# Patient Record
Sex: MALE | Race: WHITE | Employment: PART TIME | URBAN - METROPOLITAN AREA
[De-identification: names, ages, dates, MRNs, and addresses within clinical notes are randomized per-mention and may not be internally consistent; named-entity substitution may affect disease eponyms.]

---
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
## 2021-02-04 ENCOUNTER — HOSPITAL ENCOUNTER (EMERGENCY)
Age: 21
Discharge: HOME | End: 2021-02-04
Attending: EMERGENCY MEDICINE
Payer: COMMERCIAL

## 2021-02-04 ENCOUNTER — APPOINTMENT (OUTPATIENT)
Dept: GENERAL RADIOLOGY | Age: 21
End: 2021-02-04
Payer: COMMERCIAL

## 2021-02-04 ENCOUNTER — APPOINTMENT (OUTPATIENT)
Dept: ULTRASOUND IMAGING | Age: 21
End: 2021-02-04
Payer: COMMERCIAL

## 2021-02-04 VITALS
BODY MASS INDEX: 16.86 KG/M2 | RESPIRATION RATE: 18 BRPM | DIASTOLIC BLOOD PRESSURE: 80 MMHG | OXYGEN SATURATION: 100 % | HEART RATE: 87 BPM | HEIGHT: 72 IN | TEMPERATURE: 99.4 F | SYSTOLIC BLOOD PRESSURE: 100 MMHG | WEIGHT: 124.5 LBS

## 2021-02-04 DIAGNOSIS — B27.99 INFECTIOUS MONONUCLEOSIS, WITH OTHER COMPLICATION, INFECTIOUS MONONUCLEOSIS DUE TO UNSPECIFIED ORGANISM: ICD-10-CM

## 2021-02-04 DIAGNOSIS — R53.83 FATIGUE, UNSPECIFIED TYPE: Primary | ICD-10-CM

## 2021-02-04 DIAGNOSIS — R74.01 TRANSAMINITIS: ICD-10-CM

## 2021-02-04 LAB
A/G RATIO: 1.3 (ref 1.1–2.2)
ACETAMINOPHEN LEVEL: <5 UG/ML (ref 10–30)
ALBUMIN SERPL-MCNC: 4.2 G/DL (ref 3.4–5)
ALP BLD-CCNC: 216 U/L (ref 40–129)
ALT SERPL-CCNC: 668 U/L (ref 10–40)
ANION GAP SERPL CALCULATED.3IONS-SCNC: 13 MMOL/L (ref 3–16)
AST SERPL-CCNC: 499 U/L (ref 15–37)
ATYPICAL LYMPHOCYTE RELATIVE PERCENT: 12 % (ref 0–6)
BANDED NEUTROPHILS RELATIVE PERCENT: 4 % (ref 0–7)
BASOPHILS ABSOLUTE: 0.1 K/UL (ref 0–0.2)
BASOPHILS RELATIVE PERCENT: 1 %
BILIRUB SERPL-MCNC: 1.3 MG/DL (ref 0–1)
BUN BLDV-MCNC: 6 MG/DL (ref 7–20)
CALCIUM SERPL-MCNC: 9 MG/DL (ref 8.3–10.6)
CHLORIDE BLD-SCNC: 100 MMOL/L (ref 99–110)
CO2: 23 MMOL/L (ref 21–32)
CREAT SERPL-MCNC: 0.8 MG/DL (ref 0.9–1.3)
EOSINOPHILS ABSOLUTE: 0 K/UL (ref 0–0.6)
EOSINOPHILS RELATIVE PERCENT: 0 %
GFR AFRICAN AMERICAN: >60
GFR NON-AFRICAN AMERICAN: >60
GLOBULIN: 3.3 G/DL
GLUCOSE BLD-MCNC: 83 MG/DL (ref 70–99)
HAV IGM SER IA-ACNC: NORMAL
HCT VFR BLD CALC: 47.2 % (ref 40.5–52.5)
HEMATOLOGY PATH CONSULT: ABNORMAL
HEMOGLOBIN: 15.6 G/DL (ref 13.5–17.5)
HEPATITIS B CORE IGM ANTIBODY: NORMAL
HEPATITIS B SURFACE ANTIGEN INTERPRETATION: NORMAL
HEPATITIS C ANTIBODY INTERPRETATION: NORMAL
LIPASE: 22 U/L (ref 13–60)
LYMPHOCYTES ABSOLUTE: 3.9 K/UL (ref 1–5.1)
LYMPHOCYTES RELATIVE PERCENT: 35 %
MACROCYTES: ABNORMAL
MCH RBC QN AUTO: 29.6 PG (ref 26–34)
MCHC RBC AUTO-ENTMCNC: 33 G/DL (ref 31–36)
MCV RBC AUTO: 89.7 FL (ref 80–100)
MONO TEST: POSITIVE
MONOCYTES ABSOLUTE: 0.4 K/UL (ref 0–1.3)
MONOCYTES RELATIVE PERCENT: 5 %
MONONUCLEAR UNIDENTIFIED CELLS: 2 %
NEUTROPHILS ABSOLUTE: 3.8 K/UL (ref 1.7–7.7)
NEUTROPHILS RELATIVE PERCENT: 41 %
PDW BLD-RTO: 12.9 % (ref 12.4–15.4)
PLATELET # BLD: 157 K/UL (ref 135–450)
PLATELET SLIDE REVIEW: ADEQUATE
PMV BLD AUTO: 8.8 FL (ref 5–10.5)
POLYCHROMASIA: ABNORMAL
POTASSIUM SERPL-SCNC: 4.3 MMOL/L (ref 3.5–5.1)
RBC # BLD: 5.26 M/UL (ref 4.2–5.9)
S PYO AG THROAT QL: NEGATIVE
SLIDE REVIEW: ABNORMAL
SODIUM BLD-SCNC: 136 MMOL/L (ref 136–145)
TEAR DROP CELLS: ABNORMAL
TOTAL PROTEIN: 7.5 G/DL (ref 6.4–8.2)
WBC # BLD: 8.4 K/UL (ref 4–11)

## 2021-02-04 PROCEDURE — 85025 COMPLETE CBC W/AUTO DIFF WBC: CPT

## 2021-02-04 PROCEDURE — 87081 CULTURE SCREEN ONLY: CPT

## 2021-02-04 PROCEDURE — 86038 ANTINUCLEAR ANTIBODIES: CPT

## 2021-02-04 PROCEDURE — 71045 X-RAY EXAM CHEST 1 VIEW: CPT

## 2021-02-04 PROCEDURE — 87880 STREP A ASSAY W/OPTIC: CPT

## 2021-02-04 PROCEDURE — 83690 ASSAY OF LIPASE: CPT

## 2021-02-04 PROCEDURE — 99283 EMERGENCY DEPT VISIT LOW MDM: CPT

## 2021-02-04 PROCEDURE — 80143 DRUG ASSAY ACETAMINOPHEN: CPT

## 2021-02-04 PROCEDURE — 76705 ECHO EXAM OF ABDOMEN: CPT

## 2021-02-04 PROCEDURE — 36415 COLL VENOUS BLD VENIPUNCTURE: CPT

## 2021-02-04 PROCEDURE — 86308 HETEROPHILE ANTIBODY SCREEN: CPT

## 2021-02-04 PROCEDURE — 80053 COMPREHEN METABOLIC PANEL: CPT

## 2021-02-04 PROCEDURE — 83516 IMMUNOASSAY NONANTIBODY: CPT

## 2021-02-04 PROCEDURE — 80074 ACUTE HEPATITIS PANEL: CPT

## 2021-02-04 ASSESSMENT — ENCOUNTER SYMPTOMS
NAUSEA: 0
WHEEZING: 0
TROUBLE SWALLOWING: 0
COUGH: 1
SHORTNESS OF BREATH: 0
VOMITING: 0
BACK PAIN: 0
STRIDOR: 0
SORE THROAT: 1
CONSTIPATION: 0
COLOR CHANGE: 0
DIARRHEA: 0
ABDOMINAL DISTENTION: 0
VOICE CHANGE: 0
ABDOMINAL PAIN: 0

## 2021-02-04 ASSESSMENT — PAIN SCALES - GENERAL: PAINLEVEL_OUTOF10: 4

## 2021-02-04 NOTE — ED PROVIDER NOTES
[facility]        Pt Name: Yancy Mackenzie  MRN: [id number]  Armslizzettegfluis carlos 2000  Date of evaluation: 2/4/2021  Provider: Lenise Osler, PA-C  PCP: Khurram Mckeon DO    I evaluated this patient with supervising physician Dr Ruben Rushing, who did personally assess the patient. CHIEF COMPLAINT       Chief Complaint   Patient presents with    Fatigue     pt c/o feeling fatigued x 1.5 weeks. denies any fever. rapid covid negative, flu negative. HISTORY OF PRESENT ILLNESS   (Location, Timing/Onset, Context/Setting, Quality, Duration, Modifying Factors, Severity, Associated Signs and Symptoms)  Note limiting factors. Yancy Mackenzie is a 21 y.o. male who presents to the emergency department complaining of sore throat, mild cough, fatigue and poor appetite for several weeks. Patient initially states that his symptoms started off as cough, shortness of breath, nausea, loss of taste and smell. He states that he had a flu swab and Covid swab that was negative. He rates his sore throat to be a 4-10 on pain scale. He is lying comfortably in bed and does not appear to be writhing in pain, pale, diaphoretic or in acute distress. Nursing Notes were all reviewed and agreed with or any disagreements were addressed in the HPI. REVIEW OF SYSTEMS    (2-9 systems for level 4, 10 or more for level 5)     Review of Systems   Constitutional: Positive for appetite change and fatigue. Negative for chills and fever. HENT: Positive for congestion and sore throat. Negative for tinnitus, trouble swallowing and voice change. Eyes: Negative for visual disturbance. Respiratory: Positive for cough. Negative for shortness of breath, wheezing and stridor. Cardiovascular: Negative for chest pain, palpitations and leg swelling.    Gastrointestinal: Negative for abdominal distention, abdominal pain, constipation, diarrhea, nausea and vomiting. Genitourinary: Negative. Musculoskeletal: Negative for back pain, neck pain and neck stiffness. Skin: Negative for color change, pallor, rash and wound. Neurological: Negative for dizziness, tremors, seizures, syncope, facial asymmetry, speech difficulty, weakness, light-headedness, numbness and headaches. Psychiatric/Behavioral: Negative for confusion. All other systems reviewed and are negative. Positives and Pertinent negatives as per HPI. Except as noted above in the ROS, all other systems were reviewed and negative. PAST MEDICAL HISTORY     Past Medical History:   Diagnosis Date    Arachnodactyly 04/19/2013    wrist/thumb sign, striae, hypermobile joints, no aortic dilation (neg Marfan's w/u)    Asthma toddler    outgrew    Hand, foot and mouth disease 1/1/2001    Hayfever     Near sighted 2011         SURGICAL HISTORY   History reviewed. No pertinent surgical history. CURRENTMEDICATIONS       Previous Medications    No medications on file         ALLERGIES     Patient has no known allergies.     FAMILYHISTORY       Family History   Problem Relation Age of Onset    Asthma Mother     Allergic Rhinitis Mother     Eczema Mother     Osteoarthritis Mother         knees    Thyroid Disease Mother         high TSH    Other Mother         overweight, recurring childhood OM    Liver Disease Father         fatty liver/ high LFTs    Other Father         metabolic synd, GERD    Kidney Disease Father         stones    High Cholesterol Father         low HDL    Asthma Father     Diabetes Paternal Grandmother     Kidney Disease Paternal Grandmother         stones    Diabetes Maternal Grandmother     Hypertension Maternal Grandmother     Other Brother         ureathral dilation, Marfans w/u - NEGATIVE after ? pneumothorax?, recur OM till 3 y/o    Other Brother         speech problems, behav issues    Heart Defect Paternal Uncle     Other Paternal Grandfather cataracts    Clotting Disorder Paternal Grandfather         DVT    Allergic Rhinitis Paternal Aunt     Allergic Rhinitis Maternal Aunt     Alcohol Abuse Maternal Uncle     No Known Problems Brother           SOCIAL HISTORY       Social History     Tobacco Use    Smoking status: Never Smoker    Smokeless tobacco: Never Used    Tobacco comment: avoidance   Substance Use Topics    Alcohol use: No    Drug use: No       SCREENINGS             PHYSICAL EXAM    (up to 7 for level 4, 8 or more for level 5)     ED Triage Vitals [02/04/21 1255]   BP Temp Temp Source Pulse Resp SpO2 Height Weight   100/80 99.4 °F (37.4 °C) Oral 87 18 100 % 6' (1.829 m) 124 lb 8 oz (56.5 kg)       Physical Exam  Vitals signs and nursing note reviewed. Constitutional:       Appearance: Normal appearance. He is well-developed. He is not toxic-appearing or diaphoretic. HENT:      Head: Normocephalic and atraumatic. Jaw: There is normal jaw occlusion. Salivary Glands: Right salivary gland is not diffusely enlarged or tender. Left salivary gland is not diffusely enlarged or tender. Right Ear: Hearing, tympanic membrane, ear canal and external ear normal.      Left Ear: Hearing, tympanic membrane, ear canal and external ear normal.      Nose: Nose normal.      Right Sinus: No maxillary sinus tenderness or frontal sinus tenderness. Left Sinus: No maxillary sinus tenderness or frontal sinus tenderness. Mouth/Throat:      Lips: Pink. Mouth: Mucous membranes are moist.      Dentition: No dental tenderness. Tongue: No lesions. Tongue does not deviate from midline. Palate: No mass and lesions. Pharynx: Oropharynx is clear. Uvula midline. Tonsils: No tonsillar exudate or tonsillar abscesses. 1+ on the right. 1+ on the left. Eyes:      General: No scleral icterus. Right eye: No discharge. Left eye: No discharge. Extraocular Movements: Extraocular movements intact. Conjunctiva/sclera: Conjunctivae normal.      Pupils: Pupils are equal, round, and reactive to light. Neck:      Musculoskeletal: Normal range of motion. Cardiovascular:      Rate and Rhythm: Normal rate. Pulmonary:      Effort: Pulmonary effort is normal.      Breath sounds: Normal breath sounds. Abdominal:      General: Bowel sounds are normal. There is no distension. Palpations: Abdomen is soft. Tenderness: There is no abdominal tenderness. There is no right CVA tenderness or left CVA tenderness. Musculoskeletal: Normal range of motion. Lymphadenopathy:      Head:      Right side of head: No preauricular adenopathy. Left side of head: Preauricular adenopathy present. Cervical: No cervical adenopathy. Skin:     General: Skin is warm and dry. Capillary Refill: Capillary refill takes less than 2 seconds. Coloration: Skin is not jaundiced or pale. Findings: No bruising, erythema, lesion or rash. Neurological:      General: No focal deficit present. Mental Status: He is alert and oriented to person, place, and time. Psychiatric:         Mood and Affect: Mood normal.         Behavior: Behavior normal.         DIAGNOSTIC RESULTS   LABS:    Labs Reviewed   CBC WITH AUTO DIFFERENTIAL - Abnormal; Notable for the following components:       Result Value    Path Consult Flagged (*)     Atypical Lymphocytes Relative 12 (*)     Unid. Mononu 2 (*)     Macrocytes Occasional (*)     Polychromasia Occasional (*)     Tear Drop Cells Occasional (*)     All other components within normal limits    Narrative:     Performed at:  [facility] [address], [place], [address]   Phone [phone]   COMPREHENSIVE METABOLIC PANEL - Abnormal; Notable for the following components:    BUN 6 (*)     CREATININE 0.8 (*)     Total Bilirubin 1.3 (*)     Alkaline Phosphatase 216 (*)      (*)      (*)     All other components within normal limits    Narrative:     Performed at:  [facility]  [address]  [place], [address]   Phone [phone]   ACETAMINOPHEN LEVEL - Abnormal; Notable for the following components:    Acetaminophen Level <5 (*)     All other components within normal limits    Narrative:     Performed at:  [facility] [address], [address]   Phone  - Abnormal; Notable for the following components:    Mono Test POSITIVE (*)     All other components within normal limits    Narrative:     Performed at:  [facility] [address], [address]   Phone [phone] A THROAT    Narrative:     Performed at:  [facility] [address] [place], [address]   Phone [phone]   CULTURE, BETA STREP CONFIRM PLATES   LIPASE    Narrative:     Performed at:  [facility] [address] [place], [address]   Phone [phone]   HEPATITIS PANEL, ACUTE   TYRONE   F-ACTIN (SMOOTH MUSCLE) ANTIBODY W/ REFLEX TO TITER       All other labs were within normal range or not returned as of this dictation. EKG: All EKG's are interpreted by the Emergency Department Physician in the absence of a cardiologist.  Please see their note for interpretation of EKG. RADIOLOGY:   Non-plain film images such as CT, Ultrasound and MRI are read by the radiologist. Plain radiographic images are visualized and preliminarily interpreted by the ED Provider with the below findings:        Interpretation per the Radiologist below, if available at the time of this note:    [address]   Final Result   Unremarkable right upper quadrant ultrasound.          XR CHEST PORTABLE   Final Result   Unremarkable portable exam                 PROCEDURES   Unless otherwise noted below, none     Procedures    CRITICAL CARE TIME   N/A    CONSULTS:  IP CONSULT TO GI  See attending note for details regarding discussion with Dr Valentine Mitchell and DIFFERENTIAL DIAGNOSIS/MDM:   Vitals:    Vitals:    02/04/21 1255   BP: 100/80   Pulse: 87   Resp: 18   Temp: 99.4 °F (37.4 °C)   TempSrc: Oral   SpO2: 100%   Weight: 124 lb 8 oz (56.5 kg)   Height: 6' (1.829 m)       Patient was given the following medications:  Medications - No data to display        This patient presents complaining of fatigue, sore throat, poor appetite. Abdomen is soft and nontender in all 4 quadrants without pulsatile mass or CVA tenderness. His blood work is abnormal showing lymphocytosis and transaminitis. He denies IV drug use, alcohol abuse, excessive ingestion of acetaminophen, promiscuous sexual activity, history of immunocompromise disease or HIV or hepatitis. Hepatitis panel is pending at this time. Vitals remained stable throughout stay here. We did consult with GI who recommends adding some labwork including mono, which is positive.  ultrasound normal. My suspicion is low for ACS, PE, myocarditis, pericarditis, endocarditis, acute pulmonary edema, pleural effusion, pericardial effusion, cardiac tamponade, cardiomyopathy, CHF exacerbation, thoracic aortic dissection, esophageal rupture, other life-threatening arrhythmia, hypertensive urgency or emergency, hemothorax, pulmonary contusion, subcutaneous emphysema, flail chest, pneumo mediastinum, rib fracture, pneumonia, pneumothorax, ARDS, carotid dissection, sinus abscess, acute fracture, acute CVA, ICH, SAH, TIA, meningitis, encephalitis, pseudotumor cerebri, temporal arteritis, sentinel bleed from ruptured aneurysm, hypertensive urgency or emergency, subdural hematoma, epidural hematoma, cerebellar compromise, posterior stroke, bells palsy, TMJ syndrome, trigeminal neuralgia, dental abscess, Mark angina, otitis, acute pharyngitis, peritonsillar or tonsillar abscess, retropharyngeal abscess, bacterial tracheitis, epiglottitis, meningitis, encephalitis, foreign body, angioedema, anaphylaxis,  mumps, lymphoma, leukemia, asthma exacerbation, osteomyelitis, mastoiditis, sepsis, DKA, acute surgical abdomen, obstruction, perforation, abscess, mesenteric ischemia, AAA, dissection, cholecystitis, cholangitis, pancreatitis, appendicitis, C. diff colitis, diverticulitis, volvulus, incarcerated hernia, necrotizing fasciitis, intussusception, testicular torsion, epididymitis, varicocele, hydrocele, orchitis, incarcerated hernia, Felton gangrene, pyelonephritis, perinephric abscess, kidney stone, urosepsis, fistula, overt malignancy or other concerning pathology. We have addressed concerns and expectations. FINAL IMPRESSION      1. Fatigue, unspecified type    2. Transaminitis    3.  Infectious mononucleosis, with other complication, infectious mononucleosis due to unspecified organism          DISPOSITION/PLAN   DISPOSITION Decision To Discharge 02/04/2021 04:24:59 PM      PATIENT REFERREDTO:  Cheli Marshall [address]  [phone]      for follow up in 1-3 days    [facility] Emergency Department  [place] [address]  [phone]    If symptoms worsen    Roxy Rodríguez MD  [address] [facility]  [phone]      for follow up with GI specialist in 1-3 days      DISCHARGE MEDICATIONS:  New Prescriptions    No medications on file       DISCONTINUED MEDICATIONS:  Discontinued Medications    No medications on file              (Please note that portions of this note were completed with a voice recognition program.  Efforts were made to edit the dictations but occasionally words are mis-transcribed.)    Carisa Benitez PA-C (electronically signed)            Carisa Benitez PA-C  02/04/21 1748

## 2021-02-04 NOTE — ED NOTES
PT given glass of ice and 1L water to drink for US. PT stated US has been completed.       Elizabeth Colon RN  02/04/21 5382

## 2021-02-04 NOTE — LETTER
[facility] Emergency Department  [address] [place] [address], [address]             February 4, 2021    Patient: Dyan Worley   YOB: 2000   Date of Visit: 2/4/2021       To Whom It May Concern:    Alison López was seen and treated in our emergency department on 2/4/2021. He may return to work on 02/08/2021.       Sincerely,         [facility]

## 2021-02-05 ENCOUNTER — CARE COORDINATION (OUTPATIENT)
Dept: CARE COORDINATION | Age: 21
End: 2021-02-05

## 2021-02-05 LAB
ANTI-NUCLEAR ANTIBODY (ANA): NEGATIVE
HEMATOLOGY PATH CONSULT: NORMAL

## 2021-02-05 NOTE — CARE COORDINATION
Attempted ed follow up call, per revorded google assistant pt not currently available.  Will attempt at  Later date

## 2021-02-05 NOTE — ED PROVIDER NOTES
I independently performed a history and physical on Luca Zhang. All diagnostic, treatment, and disposition decisions were made by myself in conjunction with the advanced practice provider. Briefly, this is a 21 y.o. male here for generalized weakness and fatigue ongoing for the past 2 weeks. Associated with nausea and early satiety. Denies fevers, vomiting, SOB, cough or sore throat. On exam, Patient afebrile and nontoxic. No distress. Heart RRR. Lungs CTAB. Abdomen soft, nondistended, nontender to palpation in all quadrants. Screenings            MDM    Patient afebrile and nontoxic. No distress. Abdomen benign on my exam, very low suspicion for acute appendicitis/cholecystitis or pancreatitis. No respiratory symptoms, nothing to suggest pneumonia, low suspicion for COVID19. Lab workup reassuring aside from significant transaminitis, patient without any stigmata of liver disease. RUQ US nonacute. Case discussed with Dr. Jacy Penaloza, recommends additional testing for autoimmune causes and/or mononucleosis. Monospot is positive which is likely etiology of elevated liver enzymes. Patient felt safe for discharge to self care with close PCP follow up. Discussed importance of avoiding contact sports of injury due to potential splenomegaly. Will follow up with PCP for recheck of liver enzymes, will provide with GI information if this is not improving or worsening. Patient agreeable to plan. Return precautions discussed. Patient Referrals:  Cheli Fierro [address]  [phone]      for follow up in 1-3 days    [facility]  [address] [facility]  [phone]    If symptoms worsen    Gabriela Steele MD  [address] [facility]  [phone]      for follow up with GI specialist in 1-3 days      Discharge Medications: There are no discharge medications for this patient. FINAL IMPRESSION  1.  Fatigue, unspecified type    2. Transaminitis    3. Infectious mononucleosis, with other complication, infectious mononucleosis due to unspecified organism        Blood pressure 100/80, pulse 87, temperature 99.4 °F (37.4 °C), temperature source Oral, resp. rate 18, height 6' (1.829 m), weight 124 lb 8 oz (56.5 kg), SpO2 100 %. For further details of [facility] emergency department encounter, please see documentation by advanced practice provider, MAXIMILIAN Akins.     Roseann Gregory DO (electronically signed)  Attending Emergency Physician       Roseann Gregory DO  02/05/21 5514

## 2021-02-06 LAB — S PYO THROAT QL CULT: NORMAL

## 2021-02-09 LAB
F-ACTIN AB IGG: 50 UNITS (ref 0–19)
SMOOTH MUSCLE AB IGG TITER: ABNORMAL

## 2021-02-15 ENCOUNTER — OFFICE VISIT (OUTPATIENT)
Dept: FAMILY MEDICINE CLINIC | Age: 21
End: 2021-02-15
Payer: COMMERCIAL

## 2021-02-15 VITALS
BODY MASS INDEX: 16.8 KG/M2 | OXYGEN SATURATION: 98 % | HEART RATE: 78 BPM | DIASTOLIC BLOOD PRESSURE: 60 MMHG | HEIGHT: 72 IN | WEIGHT: 124 LBS | SYSTOLIC BLOOD PRESSURE: 80 MMHG | TEMPERATURE: 97.6 F

## 2021-02-15 DIAGNOSIS — B27.99 MONONUCLEOSIS, INFECTIOUS, WITH HEPATITIS: Primary | ICD-10-CM

## 2021-02-15 DIAGNOSIS — Z13.21 ENCOUNTER FOR VITAMIN DEFICIENCY SCREENING: ICD-10-CM

## 2021-02-15 DIAGNOSIS — R76.8 AUTOANTIBODY TITER POSITIVE: ICD-10-CM

## 2021-02-15 DIAGNOSIS — B17.8 MONONUCLEOSIS, INFECTIOUS, WITH HEPATITIS: Primary | ICD-10-CM

## 2021-02-15 DIAGNOSIS — Z71.89 EDUCATED ABOUT COVID-19 VIRUS INFECTION: ICD-10-CM

## 2021-02-15 PROCEDURE — 99215 OFFICE O/P EST HI 40 MIN: CPT | Performed by: FAMILY MEDICINE

## 2021-02-15 SDOH — ECONOMIC STABILITY: TRANSPORTATION INSECURITY
IN THE PAST 12 MONTHS, HAS THE LACK OF TRANSPORTATION KEPT YOU FROM MEDICAL APPOINTMENTS OR FROM GETTING MEDICATIONS?: NO

## 2021-02-15 SDOH — ECONOMIC STABILITY: INCOME INSECURITY: HOW HARD IS IT FOR YOU TO PAY FOR THE VERY BASICS LIKE FOOD, HOUSING, MEDICAL CARE, AND HEATING?: NOT HARD AT ALL

## 2021-02-15 ASSESSMENT — ENCOUNTER SYMPTOMS
BLOOD IN STOOL: 0
RHINORRHEA: 0
SINUS PAIN: 0
CONSTIPATION: 0
VOMITING: 0
DIARRHEA: 0
SINUS PRESSURE: 0
SORE THROAT: 0
COUGH: 0
NAUSEA: 1
TROUBLE SWALLOWING: 0
VOICE CHANGE: 0
SHORTNESS OF BREATH: 0
ABDOMINAL PAIN: 0
ABDOMINAL DISTENTION: 0

## 2021-02-15 ASSESSMENT — PATIENT HEALTH QUESTIONNAIRE - PHQ9
2. FEELING DOWN, DEPRESSED OR HOPELESS: 0
SUM OF ALL RESPONSES TO PHQ QUESTIONS 1-9: 0
SUM OF ALL RESPONSES TO PHQ9 QUESTIONS 1 & 2: 0
SUM OF ALL RESPONSES TO PHQ QUESTIONS 1-9: 0
1. LITTLE INTEREST OR PLEASURE IN DOING THINGS: 0

## 2021-02-15 NOTE — PROGRESS NOTES
Severino Soto (:  2000) is a 21 y.o. male,Established patient, here for evaluation of the following chief complaint(s):  ED Follow-up (ER FOLLOW UP, RAYSHAWN ARROYO- WANTING TO DISCUSS RESUTS )    ASSESSMENT/PLAN:  1217    Clary Soulier was seen today for ed follow-up. Diagnoses and all orders for this visit:    Mononucleosis, infectious, with hepatitis  Information on splenomegaly and mononucleosis also sent as a [employer] message. IF you develop ANY respiratory infection symptoms (not just allergy symptoms) suggestive of COVID-19 start the recommendations below: Instructions for Respiratory Infections (SAVE THIS SHEET)    For the first 7-14 days of symptoms follow instructions below, even before being seen in the office or even during treatment with antibiotics, until symptom free. 1. Water: Drink 1 ounce of water for every 2 pounds of body weight for adults, 56 Ounces of water/fluids per day. This will loosen mucus in the head and chest & improve the weak feeling of dehydration, allow the body to get germ fighting resources to the infection. Half of fluids can be juice or sugar free Crystal Light. Don't count drinks with caffeine, alcohol or carbonation. Infants can have Pedialyte liquid or freezer pops. Avoid salt and sports drinks if you have high Blood Pressure, swelling in the feet or ankles or have heart problems. You should drink sports drink. 2. Humidity: Humidify the air to 35-50% ( or until the windows fog over slightly). Can use a humidifier, vaporizer, boil water on the stove or put a coffee can full of water on the heater vents. This will loosen mucus from infections and allergies. 8. Zinc: (DAY ONE OF SYMPTOMS)  Zinc lozenges such as Cold Hesham (available most stores), or Basic ([employer] brand) will help shorten the duration and lessen symptoms such as sore throat, cough, nasal congestion, runny nose, and post nasal drip. Use 1 lozenge every 2-4 hours ( after meals if stomach is sensitive). Children can use 10-15 mg or less 3-4 times a day or Zinc lollypops. In pregnancy limit to 50-60 mg a day for 7 days as prenatals have Zinc also. With diarrhea use zinc pills 50 mg 1/2 to 1 pill 2x/day. 9. Vitamins: Vitamin C 500 mg with breakfast and dinner (with suspected or diagnosed COVID-19 infection take vitamin C 1000 mg 2-3 times a day). Children and pregnant women should drink citrus juices. This speeds healing and strengthens immune system. 10. Chest Symptoms: Vicks Vapor rub to the chest at bedtime. 11. Decongestants: Avoid all decongestants and antihistamine cold preparations in children. Decongestants should always be avoided in people with high blood pressure, palpitations, heart disease and those on stimulant medications. Antihistamines may impede the body's ability to fight COVID-19.  12. Frequent hand washing and/or hand gel especially after coughing or sneezing into the hands or blowing the nose to help prevent spreading to others. Use kleenex and NOT handkerchiefs. Try all of the above starting with day 1 of symptoms. If Strep throat symptoms appear call to be seen in the office as soon as possible and don't gargle on that day. Newborns, infants, or anyone with earaches or influenza may need to be seen quickly. Adults with fevers over 103 degrees or shortness of breath should call the office immediately. Multiple vitamins often contain vitamin K which increases the risk of blood clots and iron which can increase blood pressures if it is not needed. Six vitamins without vitamin K are:  Centrum Silver Chewables (the non chewable version has vitamin K), Spectravite Senior Chewables ([facility] brand), and Simply Right Super Strength Multi Iron Free (requires 2 tabs per day but is NOT a chewable) ([employer] brand), Roseann Pew Made Adult Gummy Multiple Vitamin, Clotamin ([facility]  One A Day Proactive + for men and women (requires 2 tabs per day but is NOT a chewable). Read labels as changes may occur. Nutrients that support the immune system:  Beta carotene/vitamin A  Vitamin C  Vitamin D  Zinc  Proteins  Probiotics/prebiotic's(prebiotics are fiber sources that support the growth of probiotics)  Water from all sources including foods such as fruit: Women 2.7 L / 91 ounces per day AND men 3.7 L/125 ounces per day  Source Bettie ARMENTA (award winning nutritionist/registered dietitian, author, ) 3/25/2020     Autoantibody titer positive  We will recheck later. Orders Placed This Encounter   Procedures    Hepatic Function Panel    Gamma GT     Return if symptoms worsen or fail to improve or abnormal labs, for mono with hepatitis. SUBJECTIVE/OBJECTIVE:  Chief Complaint   Patient presents with    ED Follow-up     ER FOLLOW UP, RAYSHAWN ARROYO- WANTING TO DISCUSS RESUTS    1118  HPI    Mononucleosis, infectious, with hepatitis  Had been sick for 1 wk prior to visit in ER. DX with above. 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 1st sx was tickle in throat. Then sore throat. Taste was decreased. Smell was decreased. No mater how much he drank his pee was yellow. No jaundice /icterus. Didn't eat for 1.5 days and went to work and couldn't stand and went to the ER. No meds. Rested for 11 days. Now feeling better. Had HA x1. Autoantibody titer positive  What does this mean.   COVID-19 Was not tested in the emergency room. Was positive for mono. Review of Systems   Constitutional: Positive for activity change and fatigue. Negative for appetite change, chills, diaphoresis, fever and unexpected weight change. HENT: Negative for congestion, ear discharge, ear pain, hearing loss, mouth sores, postnasal drip, rhinorrhea, sinus pressure, sinus pain, sneezing, sore throat, trouble swallowing and voice change. Respiratory: Negative for cough and shortness of breath. Gastrointestinal: Positive for nausea. Negative for abdominal distention, abdominal pain, blood in stool, constipation, diarrhea and vomiting. Neurological: Positive for dizziness (if stands quickly after sits). Negative for light-headedness and headaches. Hematological: Negative for adenopathy. Physical Exam  Constitutional:       General: He is not in acute distress. Appearance: Normal appearance. He is well-developed. He is obese. He is not diaphoretic. HENT:      Right Ear: Tympanic membrane, ear canal and external ear normal. No middle ear effusion. Tympanic membrane is not injected, erythematous, retracted or bulging. Left Ear: Tympanic membrane, ear canal and external ear normal.  No middle ear effusion. Tympanic membrane is not injected, erythematous, retracted or bulging. Nose: Mucosal edema present. No rhinorrhea. Right Sinus: No maxillary sinus tenderness or frontal sinus tenderness. Left Sinus: No maxillary sinus tenderness or frontal sinus tenderness. Mouth/Throat:      Mouth: Mucous membranes are not pale, not dry and not cyanotic. Dentition: Abnormal dentition. Pharynx: Uvula midline. Posterior oropharyngeal erythema present. No oropharyngeal exudate or uvula swelling. Tonsils: No tonsillar exudate or tonsillar abscesses. 0 on the right. 0 on the left. Comments: Gum retraction anteriorly. PN drip. Eyes:      General: No scleral icterus. Right eye: No discharge. Left eye: No discharge. Conjunctiva/sclera: Conjunctivae normal.      Right eye: Right conjunctiva is not injected. No exudate. Left eye: Left conjunctiva is not injected. No exudate. Neck:      Musculoskeletal: Neck supple. Thyroid: No thyroid mass or thyromegaly. Cardiovascular:      Rate and Rhythm: Normal rate and regular rhythm. Heart sounds: Normal heart sounds. No murmur. No friction rub. No gallop. Pulmonary:      Effort: Pulmonary effort is normal. No respiratory distress. Breath sounds: Normal breath sounds. No decreased breath sounds, wheezing, rhonchi or rales. Abdominal:      General: Abdomen is flat. Bowel sounds are normal.      Palpations: There is splenomegaly ( Spleen is palpable is a soft mass below the left ribs with inspiration). There is no hepatomegaly. Tenderness: There is no abdominal tenderness. There is no right CVA tenderness, left CVA tenderness or guarding. Negative signs include Natarajan's sign, Rovsing's sign, McBurney's sign, psoas sign and obturator sign. Lymphadenopathy:      Head:      Right side of head: No submental, submandibular, tonsillar, preauricular, posterior auricular or occipital adenopathy. Left side of head: Tonsillar and preauricular adenopathy present. No submandibular, posterior auricular or occipital adenopathy. Cervical: Cervical adenopathy present. Right cervical: No superficial, deep or posterior cervical adenopathy. Left cervical: Superficial cervical adenopathy and posterior cervical adenopathy present. No deep cervical adenopathy. Upper Body:      Right upper body: No supraclavicular, axillary or pectoral adenopathy. Left upper body: No supraclavicular, axillary or pectoral adenopathy. Skin:     General: Skin is warm and dry. Coloration: Skin is not pale. Neurological:      Mental Status: He is alert.        Vitals:    02/15/21 1030   BP: 80/60 Site: Right Upper Arm   Position: Sitting   Cuff Size: Medium Adult   Pulse: 78   Temp: 97.6 °F (36.4 °C)   TempSrc: Infrared   SpO2: 98%   Weight: 124 lb (56.2 kg)   Height: 6' (1.829 m)     BP Readings from Last 3 Encounters:   02/15/21 80/60   02/04/21 100/80   12/19/18 108/68     Pulse Readings from Last 3 Encounters:   02/15/21 78   02/04/21 87   12/19/18 61     Wt Readings from Last 3 Encounters:   02/15/21 124 lb (56.2 kg)   02/04/21 124 lb 8 oz (56.5 kg)   12/19/18 118 lb (53.5 kg) (4 %, Z= -1.77)*     * Growth percentiles are based on CDC (Boys, 2-20 Years) data. Body mass index is 16.82 kg/m². 2/4/2021 13:38 2/4/2021 13:51 2/4/2021 16:47 2/4/2021 17:24   Sodium 136      Potassium 4.3      Chloride 100      CO2 23      BUN 6 (L)      Creatinine 0.8 (L)      Anion Gap 13      GFR Non-African American >60      Glucose 83      Calcium 9.0      Total Protein 7.5      Albumin 4.2      Globulin 3.3      Albumin/Globulin Ratio 1.3      Alk Phos 216 (H)       (H)       (H)      Bilirubin 1.3 (H)      Lipase 22.0      Acetaminophen Level <5 (L)      WBC 8.4      RBC 5.26      Hemoglobin Quant 15.6      Hematocrit 47.2      MCV 89.7      MCH 29.6      MCHC 33.0      MPV 8.8      RDW 12.9      Platelet Count 117      Neutrophils % 41.0      Lymphocyte % 35.0      Monocytes % 5.0      Eosinophils % 0.0      Basophils % 1.0      Neutrophils Absolute 3.8      Lymphocytes Absolute 3.9      Monocytes Absolute 0.4      Eosinophils Absolute 0.0      Basophils Absolute 0.1      Bands Relative 4      Atypical Lymphocytes Relative 12 (H)      Polychromasia Occasional (A)      Tear Drop Cells Occasional (A)      Unid. Mononu 2 (A)      PLATELET SLIDE REVIEW Adequate      Macrocytes Occasional (A)      Rapid Strep A Screen  Negative     Strep A Culture  Normal oral juan, No Beta Strep isolated     Mono Test POSITIVE (A)      TYRONE    Negative   F-Actin IgG    50 (H)   Smooth Muscle Ab    1:160 (H)

## 2021-02-15 NOTE — PATIENT INSTRUCTIONS
Samantha Jorgensen was seen today for ed follow-up. Diagnoses and all orders for this visit:    Mononucleosis, infectious, with hepatitis    IF you develop ANY respiratory infection symptoms (not just allergy symptoms) suggestive of COVID-19 start the recommendations below: Instructions for Respiratory Infections (SAVE THIS SHEET)    For the first 7-14 days of symptoms follow instructions below, even before being seen in the office or even during treatment with antibiotics, until symptom free. 1. Water: Drink 1 ounce of water for every 2 pounds of body weight for adults, 56 Ounces of water/fluids per day. This will loosen mucus in the head and chest & improve the weak feeling of dehydration, allow the body to get germ fighting resources to the infection. Half of fluids can be juice or sugar free Crystal Light. Don't count drinks with caffeine, alcohol or carbonation. Infants can have Pedialyte liquid or freezer pops. Avoid salt and sports drinks if you have high Blood Pressure, swelling in the feet or ankles or have heart problems. You should drink sports drink. 2. Humidity: Humidify the air to 35-50% ( or until the windows fog over slightly). Can use a humidifier, vaporizer, boil water on the stove or put a coffee can full of water on the heater vents. This will loosen mucus from infections and allergies. 3. Sleep: Get 8-10 hours a night and rest during the evening after work or school. If you have trouble sleeping, adults can take Melatonin 5mg up to 2 tabs at bedtime ( not for children or pregnant women).  If Mono is suspected then sleep during 9PM to 9AM time span (if possible.) 4.Cough: Take cough medicines with Guaifenesin ( to loosen chest or head congestion) and Dextromethorphan ( to decrease excess cough). Robitussin D.M. Syrup every 4-6 hrs OR Mucinex D. M. pills OR Delsym DM syrup twice a day. Use the pediatric formulations for children over 6 months making sure they are alcohol & sugar free for children, pregnant women, and diabetics. 5. Pain And Fevers: Take Acetaminophen ( Tylenol) for fevers, aches, and headaches. 2-500 mg every 8 hours for adults. Appropriate doses at bedtime for children may help them sleep better. If pregnant take 1 -500 mg (Tylenol) every 8 hours as needed. Ibuprofen/Aleve/aspirin for pain and fevers SHOULD NOT BE USED IN THE SETTING OF POSSIBLE COVID-19 viral infection NOR if pregnant, if you have acid reflux, high blood pressure, CHF, or kidney problems. 6.Gargle: (DAY ONE OF SYMPTOMS) Gargle in the back of the throat with the head tilted back and to the sides with a strong mouthwash  ( Listerine or Scope) after meals and at bedtime at least 4 -5 times a day. This helps kill bacteria and viruses in the back of the throat and will shorten the duration and decrease the severity of your symptoms: sore throat, cough, ear popping,/ear pain, and possibly dizziness. 7. Smoking: Avoid smoking or exposure to second hand smoke. 8. Zinc: (DAY ONE OF SYMPTOMS)  Zinc lozenges such as Cold Hesham (available most stores), or Basic ([employer] brand) will help shorten the duration and lessen symptoms such as sore throat, cough, nasal congestion, runny nose, and post nasal drip. Use 1 lozenge every 2-4 hours ( after meals if stomach is sensitive). Children can use 10-15 mg or less 3-4 times a day or Zinc lollypops. In pregnancy limit to 50-60 mg a day for 7 days as prenatals have Zinc also. With diarrhea use zinc pills 50 mg 1/2 to 1 pill 2x/day. 9. Vitamins: Vitamin C 500 mg with breakfast and dinner (with suspected or diagnosed COVID-19 infection take vitamin C 1000 mg 2-3 times a day). Children and pregnant women should drink citrus juices. This speeds healing and strengthens immune system. 10. Chest Symptoms: Vicks Vapor rub to the chest at bedtime. 11. Decongestants: Avoid all decongestants and antihistamine cold preparations in children. Decongestants should always be avoided in people with high blood pressure, palpitations, heart disease and those on stimulant medications. Antihistamines may impede the body's ability to fight COVID-19.  12. Frequent hand washing and/or hand gel especially after coughing or sneezing into the hands or blowing the nose to help prevent spreading to others. Use kleenex and NOT handkerchiefs. Try all of the above starting with day 1 of symptoms. If Strep throat symptoms appear call to be seen in the office as soon as possible and don't gargle on that day. Newborns, infants, or anyone with earaches or influenza may need to be seen quickly. Adults with fevers over 103 degrees or shortness of breath should call the office immediately. Multiple vitamins often contain vitamin K which increases the risk of blood clots and iron which can increase blood pressures if it is not needed. Six vitamins without vitamin K are:  Centrum Silver Chewables (the non chewable version has vitamin K), Spectravite Senior Chewables ([facility] brand), and Simply Right Super Strength Multi Iron Free (requires 2 tabs per day but is NOT a chewable) ([employer] brand), Josephine Heaps Made Adult Gummy Multiple Vitamin, Clotamin ([employer]  One A Day Proactive + for men and women (requires 2 tabs per day but is NOT a chewable). Read labels as changes may occur.     Nutrients that support the immune system:  Beta carotene/vitamin A  Vitamin C  Vitamin D  Zinc  Proteins Probiotics/prebiotic's(prebiotics are fiber sources that support the growth of probiotics)  Water from all sources including foods such as fruit: Women 2.7 L / 91 ounces per day AND men 3.7 L/125 ounces per day  Source Bettie ARMENTA (award winning nutritionist/registered dietitian, author, ) 3/25/2020     Autoantibody titer positive    Orders Placed This Encounter   Procedures    Hepatic Function Panel    Gamma GT

## 2021-02-16 DIAGNOSIS — B27.99 MONONUCLEOSIS, INFECTIOUS, WITH HEPATITIS: ICD-10-CM

## 2021-02-16 DIAGNOSIS — B17.8 MONONUCLEOSIS, INFECTIOUS, WITH HEPATITIS: ICD-10-CM

## 2021-02-16 DIAGNOSIS — Z13.21 ENCOUNTER FOR VITAMIN DEFICIENCY SCREENING: ICD-10-CM

## 2021-02-16 DIAGNOSIS — R76.8 AUTOANTIBODY TITER POSITIVE: ICD-10-CM

## 2021-02-16 LAB
ALBUMIN SERPL-MCNC: 4.4 G/DL (ref 3.4–5)
ALP BLD-CCNC: 139 U/L (ref 40–129)
ALT SERPL-CCNC: 119 U/L (ref 10–40)
AST SERPL-CCNC: 56 U/L (ref 15–37)
BILIRUB SERPL-MCNC: 0.7 MG/DL (ref 0–1)
BILIRUBIN DIRECT: <0.2 MG/DL (ref 0–0.3)
BILIRUBIN, INDIRECT: ABNORMAL MG/DL (ref 0–1)
GAMMA GLUTAMYL TRANSFERASE: 56 U/L (ref 8–61)
TOTAL PROTEIN: 7.9 G/DL (ref 6.4–8.2)
VITAMIN D 25-HYDROXY: 12.1 NG/ML

## 2021-03-11 ENCOUNTER — OFFICE VISIT (OUTPATIENT)
Dept: FAMILY MEDICINE CLINIC | Age: 21
End: 2021-03-11
Payer: COMMERCIAL

## 2021-03-11 VITALS
DIASTOLIC BLOOD PRESSURE: 60 MMHG | BODY MASS INDEX: 16.55 KG/M2 | WEIGHT: 122.2 LBS | HEIGHT: 72 IN | SYSTOLIC BLOOD PRESSURE: 102 MMHG | OXYGEN SATURATION: 97 % | HEART RATE: 90 BPM | TEMPERATURE: 98.1 F

## 2021-03-11 DIAGNOSIS — B27.99 MONONUCLEOSIS, INFECTIOUS, WITH HEPATITIS: ICD-10-CM

## 2021-03-11 DIAGNOSIS — R63.6 UNDERWEIGHT: ICD-10-CM

## 2021-03-11 DIAGNOSIS — B17.8 MONONUCLEOSIS, INFECTIOUS, WITH HEPATITIS: ICD-10-CM

## 2021-03-11 DIAGNOSIS — R74.8 ELEVATED LIVER ENZYMES: ICD-10-CM

## 2021-03-11 DIAGNOSIS — R16.1 SPLENOMEGALY: Primary | ICD-10-CM

## 2021-03-11 DIAGNOSIS — R63.4 WEIGHT LOSS, UNINTENTIONAL: ICD-10-CM

## 2021-03-11 LAB — SEDIMENTATION RATE, ERYTHROCYTE: 0 MM/HR (ref 0–15)

## 2021-03-11 PROCEDURE — 99215 OFFICE O/P EST HI 40 MIN: CPT | Performed by: FAMILY MEDICINE

## 2021-03-11 RX ORDER — ACETAMINOPHEN 160 MG
1 TABLET,DISINTEGRATING ORAL DAILY
COMMUNITY
End: 2022-03-29 | Stop reason: ALTCHOICE

## 2021-03-11 RX ORDER — MULTIVIT-MIN/IRON/FOLIC ACID/K 18-600-40
1 CAPSULE ORAL DAILY
COMMUNITY
End: 2022-03-29 | Stop reason: ALTCHOICE

## 2021-03-11 ASSESSMENT — ENCOUNTER SYMPTOMS
SINUS PAIN: 0
RHINORRHEA: 1
SORE THROAT: 0
ABDOMINAL DISTENTION: 0
TROUBLE SWALLOWING: 0
VOICE CHANGE: 0
CONSTIPATION: 0
ABDOMINAL PAIN: 1
NAUSEA: 0
SINUS PRESSURE: 0
DIARRHEA: 0
VOMITING: 0

## 2021-03-11 NOTE — PATIENT INSTRUCTIONS
Khris Pollock was seen today for discuss labs. Diagnoses and all orders for this visit:    Splenomegaly  -     US LIVER SPLEEN; Future    Elevated liver enzymes  -     Hepatic Function Panel; Future  -     US LIVER SPLEEN; Future    Mononucleosis, infectious, with hepatitis  -     Hepatic Function Panel; Future  -     C-Reactive Protein; Future  -     Sedimentation Rate; Future    Weight loss  Need more calories. Should gaining. You can increase your protein using egg substitutes, eating small portions of red meat and using more skinless poultry and fish to meet your protein needs. Cottage cheese and greek yogurt are also good sources of protein. Try to get some of your proteins from plant sources such as beans, nuts, peas and / or soy products such as tofu or soy milk. Whey protein powders 1 scoop daily can add 15-25% of your daily need when mixed in with food or as a smoothie blended with vegetable or fruit juice, yogurt or milk. You can also substitute soy or alfalfa powder which is cholesterol free. Patient Education        Body Mass Index: Care Instructions  Your Care Instructions     Body mass index (BMI) can help you see if your weight is raising your risk for health problems. It uses a formula to compare how much you weigh with how tall you are. · A BMI lower than 18.5 is considered underweight. · A BMI between 18.5 and 24.9 is considered healthy. · A BMI between 25 and 29.9 is considered overweight. A BMI of 30 or higher is considered obese. If your BMI is in the normal range, it means that you have a lower risk for weight-related health problems. If your BMI is in the overweight or obese range, you may be at increased risk for weight-related health problems, such as high blood pressure, heart disease, stroke, arthritis or joint pain, and diabetes.  If your BMI is in the underweight range, you may be at increased risk for health problems such as fatigue, lower protection (immunity) against illness, muscle loss, bone loss, hair loss, and hormone problems. BMI is just one measure of your risk for weight-related health problems. You may be at higher risk for health problems if you are not active, you eat an unhealthy diet, or you drink too much alcohol or use tobacco products. Follow-up care is a key part of your treatment and safety. Be sure to make and go to all appointments, and call your doctor if you are having problems. It's also a good idea to know your test results and keep a list of the medicines you take. How can you care for yourself at home? · Practice healthy eating habits. This includes eating plenty of fruits, vegetables, whole grains, lean protein, and low-fat dairy. · If your doctor recommends it, get more exercise. Walking is a good choice. Bit by bit, increase the amount you walk every day. Try for at least 30 minutes on most days of the week. · Do not smoke. Smoking can increase your risk for health problems. If you need help quitting, talk to your doctor about stop-smoking programs and medicines. These can increase your chances of quitting for good. · Limit alcohol to 2 drinks a day for men and 1 drink a day for women. Too much alcohol can cause health problems. If you have a BMI higher than 25  · Your doctor may do other tests to check your risk for weight-related health problems. This may include measuring the distance around your waist. A waist measurement of more than 40 inches in men or 35 inches in women can increase the risk of weight-related health problems. · Talk with your doctor about steps you can take to stay healthy or improve your health. You may need to make lifestyle changes to lose weight and stay healthy, such as changing your diet and getting regular exercise. If you have a BMI lower than 18.5  · Your doctor may do other tests to check your risk for health problems. · Talk with your doctor about steps you can take to stay healthy or improve your health.  You may need to make lifestyle changes to gain or maintain weight and stay healthy, such as getting more healthy foods in your diet and doing exercises to build muscle. Where can you learn more? Go to https://[employer] Inc.anastasia.MineSense Technologies. org and sign in to your [employer] account. Enter S176 in the [facility] box to learn more about \"Body Mass Index: Care Instructions. \"     If you do not have an account, please click on the \"Sign Up Now\" link. Current as of: September 23, 2020               Content Version: 12.8  © 2258-1765 [employer]. Care instructions adapted under license by [facility] ([facility]). If you have questions about a medical condition or this instruction, always ask your healthcare professional. [address] any warranty or liability for your use of this information. Patient Education        High-Calorie and High-Protein Diet: Care Instructions  Overview     A high-calorie, high-protein diet gives you more energy and extra nutrition to help your body heal. Your doctor and dietitian can help you design a diet based on your health and what you prefer to eat. Always talk with your doctor or dietitian before you make changes in your diet. Follow-up care is a key part of your treatment and safety. Be sure to make and go to all appointments, and call your doctor if you are having problems. It's also a good idea to know your test results and keep a list of the medicines you take. How can you care for yourself at home? · Eat three meals a day, plus snacks in between and at bedtime. · Include favorite foods in your meals. This will help make meals more pleasant. · Drink your beverage at the end of the meal, because drinking before or during the meal can fill you up. · Eat high-protein foods, such as:  ? Meat, fish, and poultry. ? Milk and milk products. Add powdered milk to other foods (such as pudding or soups) to boost the protein. ? Eggs. ?  Cooked dried beans and peas. ? Peanut butter, nuts, and seeds. ? Tofu.  ? Cheeses. ? Protein bars. · Eat high-calorie foods, such as:  ? Butter, honey, and brown sugar, added to foods to make them taste better. ? Oils, sauces, and gravies. ? Peanut butter. ? Whole milk, yogurt, mayonnaise, and sour cream.  ? Granola cereal with fruit and granola bars. ? Muffins, pancakes, waffles, and other breads. ? Milkshakes, puddings, and custard. · Try a liquid meal replacement, such as Ensure, Boost, or instant breakfast drinks, if you have trouble eating solid foods. They will give you both calories and protein. Soups and smoothies also are good sources of nutrition. · Keep snacks around that are easy to eat, such as pudding, energy bars, ice cream, and flavored ice pops. Where can you learn more? Go to https://NextIOpeZenDoc.[employer]izer [employer]. org and sign in to your [employer] account. Enter C676 in the [facility] box to learn more about \"High-Calorie and High-Protein Diet: Care Instructions. \"     If you do not have an account, please click on the \"Sign Up Now\" link. Current as of: December 17, 2020               Content Version: 12.8  © 1534-3068 Healthwise, Incorporated. Care instructions adapted under license by [facility] ([facility]). If you have questions about a medical condition or this instruction, always ask your healthcare professional. [place] [address] any warranty or liability for your use of this information.

## 2021-03-11 NOTE — PROGRESS NOTES
BMI is in the normal range, it means that you have a lower risk for weight-related health problems. If your BMI is in the overweight or obese range, you may be at increased risk for weight-related health problems, such as high blood pressure, heart disease, stroke, arthritis or joint pain, and diabetes. If your BMI is in the underweight range, you may be at increased risk for health problems such as fatigue, lower protection (immunity) against illness, muscle loss, bone loss, hair loss, and hormone problems. BMI is just one measure of your risk for weight-related health problems. You may be at higher risk for health problems if you are not active, you eat an unhealthy diet, or you drink too much alcohol or use tobacco products. Follow-up care is a key part of your treatment and safety. Be sure to make and go to all appointments, and call your doctor if you are having problems. It's also a good idea to know your test results and keep a list of the medicines you take. How can you care for yourself at home? · Practice healthy eating habits. This includes eating plenty of fruits, vegetables, whole grains, lean protein, and low-fat dairy. · If your doctor recommends it, get more exercise. Walking is a good choice. Bit by bit, increase the amount you walk every day. Try for at least 30 minutes on most days of the week. · Do not smoke. Smoking can increase your risk for health problems. If you need help quitting, talk to your doctor about stop-smoking programs and medicines. These can increase your chances of quitting for good. · Limit alcohol to 2 drinks a day for men and 1 drink a day for women. Too much alcohol can cause health problems. If you have a BMI higher than 25  · Your doctor may do other tests to check your risk for weight-related health problems.  This may include measuring the distance around your waist. A waist measurement of more than 40 inches in men or 35 inches in women can increase the risk of three meals a day, plus snacks in between and at bedtime. · Include favorite foods in your meals. This will help make meals more pleasant. · Drink your beverage at the end of the meal, because drinking before or during the meal can fill you up. · Eat high-protein foods, such as:  ? Meat, fish, and poultry. ? Milk and milk products. Add powdered milk to other foods (such as pudding or soups) to boost the protein. ? Eggs. ? Cooked dried beans and peas. ? Peanut butter, nuts, and seeds. ? Tofu.  ? Cheeses. ? Protein bars. · Eat high-calorie foods, such as:  ? Butter, honey, and brown sugar, added to foods to make them taste better. ? Oils, sauces, and gravies. ? Peanut butter. ? Whole milk, yogurt, mayonnaise, and sour cream.  ? Granola cereal with fruit and granola bars. ? Muffins, pancakes, waffles, and other breads. ? Milkshakes, puddings, and custard. · Try a liquid meal replacement, such as Ensure, Boost, or instant breakfast drinks, if you have trouble eating solid foods. They will give you both calories and protein. Soups and smoothies also are good sources of nutrition. · Keep snacks around that are easy to eat, such as pudding, energy bars, ice cream, and flavored ice pops. Where can you learn more? Go to https://Bio2 TechnologiesaaliyahProtein Forest.W[employer]). org and sign in to your Learnmetrics account. Enter G459 in the [facility] box to learn more about \"High-Calorie and High-Protein Diet: Care Instructions. \"     If you do not have an account, please click on the \"Sign Up Now\" link. Current as of: December 17, 2020               Content Version: 12.8  © 0384-6860 Healthwise, Incorporated. Care instructions adapted under license by [facility] ([facility]). If you have questions about a medical condition or this instruction, always ask your healthcare professional. [address] any warranty or liability for your use of this information.       Return if symptoms worsen or fail to improve, for per labs and the ultrasound. SUBJECTIVE/OBJECTIVE:   Chief Complaint   Patient presents with   [address]     pt is here for follow up for liver test- still feeling like his lymph nodes are englards and side pain    327  HPI    Mono  If gets 8 hrs functional at 80%. If 9-10 hr is %. Feels tired if on his feet for more than 6 hrs. Still has enlarged lymph node under chin and ant to left ear. Spleen tender at times. On vitamin C, Vitamin D, MVI without K. He walks around track 1/4 mi 3x/wk. Snehta ball 2x/wk x 1 hr. Eats a lot of chicken. No loss of appetite. Calories not sure how many calories. Infrequent fruit/veggies. No protein at breakfast.   Works 18 hrs/wk. Drinking 3-4 Powerades also water 1-2 bottles. Review of Systems   Constitutional: Positive for activity change and fatigue. Negative for appetite change, chills, diaphoresis and fever. HENT: Positive for congestion (with allergies), rhinorrhea (allergies) and sneezing. Negative for ear discharge, ear pain, hearing loss, nosebleeds, postnasal drip, sinus pressure, sinus pain, sore throat, tinnitus, trouble swallowing and voice change. Gastrointestinal: Positive for abdominal pain. Negative for abdominal distention, constipation, diarrhea, nausea and vomiting. Neurological: Positive for light-headedness (if gets up fast). Negative for dizziness, weakness and headaches. 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 No pertinent surgical history.     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     Comment: [place]    Occupation: [employer]     Comment: bakes and scoops and customers   Social Needs    Financial resource strain: Not hard at all   Shaheed-Mariila insecurity     Worry: Never true     Inability: Never true   [employer] needs     Medical: No     Non-medical: No   Tobacco Use    Smoking status: Never Smoker    Smokeless tobacco: Never Used    Tobacco comment: avoidance   Substance and Sexual Activity    Alcohol use: No    Drug use: No    Sexual activity: Yes     Partners: Female     Birth control/protection: Pill   Lifestyle    Physical activity     Days per week: Not on file     Minutes per session: Not on file    Stress: Not on file   Relationships    Social connections     Talks on phone: Not on file     Gets together: Not on file     Attends Alevism service: Not on file     Active member of club or organization: Not on file     Attends meetings of clubs or organizations: Not on file     Relationship status: Not on file    Intimate partner violence     Fear of current or ex partner: Not on file     Emotionally abused: Not on file     Physically abused: Not on file     Forced sexual activity: Not on file   Other Topics Concern    Not on file   Social History Narrative    Social History: School: [employer], School Performance: - mostly A's and B's. Likes social studies. Collects model cars. Has a dog and 4 gold fish. Patient Lives with: parent(s). No Sports. 15 push ups and sit -ups bid. Plays basketball. Plays volley ball 2x/wk for 2.5 hr. 15 push ups and sit -ups qhs.12/19/18. None now. 2/15/21. He walks around track 1/4 mi 3x/wk. Volley ball 2x/wk x 1 hr.3/11/21.        Family History   Problem Relation Age of Onset    Asthma Mother     Allergic Rhinitis Mother     Eczema Mother     Osteoarthritis Mother         knees    Thyroid Disease Mother         high TSH    Other Mother         overweight, recurring childhood OM    Liver Disease Father         fatty liver/ high LFTs    Other Father         metabolic synd, GERD    Kidney Disease Father         stones    High Cholesterol Father         low HDL    Asthma Father     Diabetes Paternal Grandmother     Kidney Disease Paternal Grandmother         stones    Diabetes Maternal Grandmother     Hypertension Maternal Grandmother     Other Brother         ureathral dilation, Marfans w/u - NEGATIVE after ? pneumothorax?, recur OM till 3 y/o    Other Brother         speech problems, behav issues    Heart Defect Paternal Uncle     Other Paternal Grandfather         cataracts, methothelioma    Clotting Disorder Paternal Grandfather         DVT    Allergic Rhinitis Paternal Aunt     Allergic Rhinitis Maternal Aunt     Alcohol Abuse Maternal Uncle     No Known Problems Brother      No Known Allergies    Current Outpatient Medications   Medication Sig Dispense Refill    MULTIPLE VITAMIN PO Take 1 tablet by mouth daily      Ascorbic Acid (VITAMIN C) 500 MG CAPS Take 1 capsule by mouth daily      Cholecalciferol (VITAMIN D3) 50 MCG (2000 UT) CAPS Take 1 capsule by mouth daily       No current facility-administered medications for this visit. Physical Exam  Vitals signs and nursing note reviewed. Constitutional:       General: He is not in acute distress. Appearance: He is well-developed. He is not diaphoretic. HENT:      Right Ear: Tympanic membrane, ear canal and external ear normal. No middle ear effusion. Tympanic membrane is not injected, erythematous, retracted or bulging. Left Ear: Tympanic membrane, ear canal and external ear normal.  No middle ear effusion.  Tympanic membrane is not injected, posterior cervical adenopathy. Left cervical: No superficial, deep or posterior cervical adenopathy. Upper Body:      Right upper body: No supraclavicular, axillary, pectoral or epitrochlear adenopathy. Left upper body: No supraclavicular, axillary, pectoral or epitrochlear adenopathy. Lower Body: No right inguinal adenopathy. No left inguinal adenopathy. Skin:     General: Skin is warm and dry. Coloration: Skin is not pale. Psychiatric:         Attention and Perception: Attention and perception normal.         Mood and Affect: Mood and affect normal.         Speech: Speech normal.         Behavior: Behavior normal. Behavior is cooperative. Cognition and Memory: Cognition and memory normal.         Judgment: Judgment normal.       Vitals:    03/11/21 1459   BP: 102/60   Site: Right Upper Arm   Position: Sitting   Cuff Size: Medium Adult   Pulse: 90   Temp: 98.1 °F (36.7 °C)   TempSrc: Infrared   SpO2: 97%   Weight: 122 lb 3.2 oz (55.4 kg)   Height: 6' (1.829 m)     BP Readings from Last 3 Encounters:   03/11/21 102/60   02/15/21 80/60   02/04/21 100/80     Pulse Readings from Last 3 Encounters:   03/11/21 90   02/15/21 78   02/04/21 87     Wt Readings from Last 3 Encounters:   03/11/21 122 lb 3.2 oz (55.4 kg)   02/15/21 124 lb (56.2 kg)   02/04/21 124 lb 8 oz (56.5 kg)     Body mass index is 16.57 kg/m². On this date 3/11/2021 I have spent 40 minutes reviewing previous notes, test results and face to face with the patient discussing the diagnosis and importance of compliance with the treatment plan as well as documenting on the day of the visit. An electronic signature was used to authenticate this note.     --Tremaine Truong, DO

## 2021-03-12 LAB
ALBUMIN SERPL-MCNC: 4.6 G/DL (ref 3.4–5)
ALP BLD-CCNC: 99 U/L (ref 40–129)
ALT SERPL-CCNC: 29 U/L (ref 10–40)
AST SERPL-CCNC: 31 U/L (ref 15–37)
BILIRUB SERPL-MCNC: 0.6 MG/DL (ref 0–1)
BILIRUBIN DIRECT: <0.2 MG/DL (ref 0–0.3)
BILIRUBIN, INDIRECT: NORMAL MG/DL (ref 0–1)
C-REACTIVE PROTEIN: <3 MG/L (ref 0–5.1)
TOTAL PROTEIN: 7.3 G/DL (ref 6.4–8.2)

## 2021-03-18 ENCOUNTER — HOSPITAL ENCOUNTER (OUTPATIENT)
Dept: ULTRASOUND IMAGING | Age: 21
Discharge: HOME | End: 2021-03-18
Payer: COMMERCIAL

## 2021-03-18 DIAGNOSIS — R74.8 ELEVATED LIVER ENZYMES: ICD-10-CM

## 2021-03-18 DIAGNOSIS — R16.1 SPLENOMEGALY: ICD-10-CM

## 2021-03-18 PROCEDURE — 76705 ECHO EXAM OF ABDOMEN: CPT

## 2021-04-02 DIAGNOSIS — Z20.822 CLOSE EXPOSURE TO COVID-19 VIRUS: Primary | ICD-10-CM

## 2021-04-02 PROBLEM — R63.4 WEIGHT LOSS, UNINTENTIONAL: Status: ACTIVE | Noted: 2021-04-02

## 2021-04-13 ENCOUNTER — OFFICE VISIT (OUTPATIENT)
Dept: FAMILY MEDICINE CLINIC | Age: 21
End: 2021-04-13
Payer: COMMERCIAL

## 2021-04-13 VITALS
DIASTOLIC BLOOD PRESSURE: 76 MMHG | OXYGEN SATURATION: 97 % | HEIGHT: 72 IN | BODY MASS INDEX: 16.93 KG/M2 | WEIGHT: 125 LBS | SYSTOLIC BLOOD PRESSURE: 116 MMHG | HEART RATE: 75 BPM | RESPIRATION RATE: 20 BRPM

## 2021-04-13 DIAGNOSIS — L30.9 ECZEMA OF BOTH HANDS: ICD-10-CM

## 2021-04-13 DIAGNOSIS — R59.1 LYMPHADENOPATHY OF HEAD AND NECK: Primary | ICD-10-CM

## 2021-04-13 PROCEDURE — 99214 OFFICE O/P EST MOD 30 MIN: CPT | Performed by: FAMILY MEDICINE

## 2021-04-13 ASSESSMENT — PATIENT HEALTH QUESTIONNAIRE - PHQ9
SUM OF ALL RESPONSES TO PHQ QUESTIONS 1-9: 0
SUM OF ALL RESPONSES TO PHQ QUESTIONS 1-9: 0
SUM OF ALL RESPONSES TO PHQ9 QUESTIONS 1 & 2: 0
SUM OF ALL RESPONSES TO PHQ QUESTIONS 1-9: 0
1. LITTLE INTEREST OR PLEASURE IN DOING THINGS: 0

## 2021-04-13 ASSESSMENT — ENCOUNTER SYMPTOMS
VOICE CHANGE: 0
RHINORRHEA: 1
SINUS PAIN: 0
SINUS PRESSURE: 0
SORE THROAT: 0

## 2021-04-13 NOTE — PROGRESS NOTES
dryness and itching worse. Pat dry. · Apply a moisturizer after bathing. Use a cream such as Lubriderm, Moisturel, or Cetaphil that does not irritate the skin or cause a rash. Apply the cream while your skin is still damp after lightly drying with a towel. · Use cold, wet cloths to reduce itching. · Keep cool, and stay out of the sun. · If itching affects your normal activities, an over-the-counter antihistamine, such as diphenhydramine (Benadryl) or loratadine (Claritin) may help. Read and follow all instructions on the label. When should you call for help? Call your doctor now or seek immediate medical care if:    · Your rash gets worse and you have a fever.     · You have new blisters or bruises, or the rash spreads and looks like a sunburn.     · You have signs of infection, such as:  ? Increased pain, swelling, warmth, or redness. ? Red streaks leading from the rash. ? Pus draining from the rash. ? A fever.     · You have crusting or oozing sores.     · You have joint aches or body aches along with your rash. Watch closely for changes in your health, and be sure to contact your doctor if:    · Your rash does not clear up after 2 to 3 weeks of home treatment.     · Itching interferes with your sleep or daily activities. Where can you learn more? Go to https://Life in Hi-Fipepiceweb.Life in Hi-Fi. org and sign in to your [employer] account. Enter M048 in the [facility] box to learn more about \"Atopic Dermatitis: Care Instructions. \"     If you do not have an account, please click on the \"Sign Up Now\" link. Current as of: July 2, 2020               Content Version: 12.8  © 9518-4997 Healthwise, [employer]. Care instructions adapted under license by [facility] ([facility]). If you have questions about a medical condition or this instruction, always ask your healthcare professional. [address] any warranty or liability for your use of this information.     Patient Education Lymphadenitis: Care Instructions  Your Care Instructions  Lymph nodes are small, bean-shaped glands throughout the body. They help the body fight germs and infections. Lymphadenitis is a swelling of a lymph node. It can be caused by an infection or other condition. The infection is most often in a nearby part of the body. A common example is the lumps on both sides of your neck under the jaw that get tender and bigger when you have a cold or sore throat. Sometimes the lymph node itself may be infected. Usually the swollen lymph nodes go back to normal size without a problem. Treatment, if needed, focuses on treating the cause. For example, a bacterial infection may be treated with antibiotics. This should bring the node back to normal size. An infection caused by a virus often goes away on its own. In rare cases, a badly infected node may need to be drained by your doctor. Follow-up care is a key part of your treatment and safety. Be sure to make and go to all appointments, and call your doctor if you are having problems. It's also a good idea to know your test results and keep a list of the medicines you take. How can you care for yourself at home? · Be safe with medicines. ? If your doctor prescribed antibiotics, take them as directed. Do not stop taking them just because you feel better. You need to take the full course of antibiotics. ? Ask your doctor if you can take an over-the-counter pain medicine, such as acetaminophen (Tylenol), ibuprofen (Advil, Motrin), or naproxen (Aleve). Read and follow all instructions on the label. · If you have pain, try a warm compress. Soak a towel or washcloth in warm water. Wring it out, and place it on the affected skin. · Do not squeeze, drain, or puncture a painful lump. Doing this can irritate or inflame the lump, push any existing infection deeper into the skin, or cause severe bleeding. When should you call for help?    Call your doctor now or seek immediate medical care if:    · Your lymph nodes get bigger.     · The area becomes red and feels more tender.     · You have a fever that does not go away. Watch closely for changes in your health, and be sure to contact your doctor if:    · You do not get better as expected. Where can you learn more? Go to https://chpepiceweb.Voxel.pl. org and sign in to your [employer] account. Enter V744 in the [facility] box to learn more about \"Lymphadenitis: Care Instructions. \"     If you do not have an account, please click on the \"Sign Up Now\" link. Current as of: September 23, 2020               Content Version: 12.8  © 2006-2021 [employer]. Care instructions adapted under license by [facility]"[employer]" [facility] ([facility]). If you have questions about a medical condition or this instruction, always ask your healthcare professional. [place] [address] any warranty or liability for your use of this information. Return if symptoms worsen or fail to improve. SUBJECTIVE/OBJECTIVE:  Chief Complaint   Patient presents with    Other     pt c/o lymph nodes still being large since he had mono back in fabruary    Rash     pt c/o rah on right hand, gotten worse, redness, no itching or pain   850  HPI    Lymph nodes  1 node on left face in front of ear. 2 are under the chin. Not tender just enlarged. Activity level is back to normal.  Rash  Started 1 week ago. Was on both hands. Now on the right hand predominant. Family hx of eczema. Has been putting aloe vera gel, unscented lotion, cold compress. At job he sanitizes. Has gloves with powder and without. Rarely uses powdered gloves. His hands sweat a lot. Review of Systems   Constitutional: Negative for activity change, appetite change, chills, diaphoresis, fatigue, fever and unexpected weight change. HENT: Positive for congestion ( allergies ), postnasal drip (allergies ), rhinorrhea ( allergies) and sneezing ( allergies).  Negative for sinus pressure, sinus pain, sore throat and voice change. 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 No pertinent surgical history. 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     Comment: [employer]    Occupation: [employer]     Comment: bakes and scoops and customers   Social Needs    Financial resource strain: Not hard at all   [employer] insecurity     Worry: Never true     Inability: Never true   [employer] needs     Medical: No     Non-medical: No   Tobacco Use    Smoking status: Never Smoker    Smokeless tobacco: Never Used    Tobacco comment: avoidance   Substance and Sexual Activity    Alcohol use: No    Drug use: No    Sexual activity: Yes     Partners: Female     Birth control/protection: Pill   Lifestyle    Physical activity     Days per week: Not on file     Minutes per session: Not on file    Stress: Not on file   Relationships    Social connections     Talks on phone: Not on file     Gets together: Not on file     Attends Yazidism service: Not on file     Active member of club or organization: Not on file     Attends meetings of clubs or organizations: Not on file     Relationship status: Not on file    Intimate partner violence     Fear of current or ex partner: Not on file CRP      <3.0   Albumin 4.2    4.4 4.6   Globulin 3.3        Albumin/Globulin Ratio 1.3        Alk Phos 216 (H)    139 (H) 99    (H)    119 (H) 29    (H)    56 (H) 31   Bilirubin 1.3 (H)    0.7 0.6   Bilirubin, Direct     <0.2 <0.2   GGT     56    Lipase 22.0        Vit D, 25-Hydroxy     12.1 (L)    Acetaminophen Level <5 (L)        WBC 8.4        RBC 5.26        Hemoglobin Quant 15.6        Hematocrit 47.2        MCV 89.7        MCH 29.6        MCHC 33.0        MPV 8.8        RDW 12.9        Platelet Count 310        Neutrophils % 41.0        Lymphocyte % 35.0        Monocytes % 5.0        Eosinophils % 0.0        Basophils % 1.0        Neutrophils Absolute 3.8        Lymphocytes Absolute 3.9        Monocytes Absolute 0.4        Eosinophils Absolute 0.0        Basophils Absolute 0.1        Bands Relative 4        Atypical Lymphocytes Relative 12 (H)        Polychromasia Occasional (A)        Tear Drop Cells Occasional (A)        Unid. Mononu 2 (A)        PLATELET SLIDE REVIEW Adequate        Macrocytes Occasional (A)        Sed Rate      0   Rapid Strep A Screen  Negative       Strep A Culture  Normal oral juan, No Beta Strep isolated       Mono Test POSITIVE (A)        TYRONE    Negative     F-Actin IgG    0 - 19 Units    50 (H)     Smooth Muscle Ab  <1:20    1:160 (H)     Hep A IgM   Non-reactive      Hep B S Ag Interp   Non-reactive      Hep C Ab Interp   Non-reactive      Hep B Core Ab, IgM   Non-reactive        Liver spleen ultrasound  3/18/2021 6:46 am     COMPARISON:  None.     HISTORY:  ORDERING SYSTEM PROVIDED HISTORY: Splenomegaly  TECHNOLOGIST PROVIDED HISTORY:  This procedure can be scheduled via [employer]. Access your [employer] account by  visiting [employer]. Reason for exam:->mono with above  Acuity: Unknown  Type of Exam: Unknown     FINDINGS:  LIVER:  No focal hepatic lesion was seen at real-time imaging by the  sonographer.   Liver is approximately 13.1 cm in length.     BILIARY SYSTEM:  Gallbladder is unremarkable without evidence of  pericholecystic fluid, wall thickening or stones. Negative sonographic  Natarajan's sign. Gallbladder wall is approximately 2 mm that as.     Common bile duct is within normal limits measuring 3 mm.     RIGHT KIDNEY: Right kidney is approximately 9.9 cm in length and left kidney  is approximately 10.5 cm in length. No hydronephrosis.     OTHER: Spleen measures 4.7 x 9.8 x 5.9 cm. Limited views of the pancreas are  unremarkable.     IMPRESSION:  No evidence of splenomegaly. 34 minutes was spent in review of diagnosis, treatment plan and review of past visits and labs and imaging. An electronic signature was used to authenticate this note.     --Norma Milligan, DO

## 2021-04-13 NOTE — PATIENT INSTRUCTIONS
Katey Alejo was seen today for other and rash. Diagnoses and all orders for this visit:    Lymphadenopathy of head and neck  Continue vitamin regimen and add a healthy diet. Exercise for short periods to decrease stress. Get plenty of sleep. Eczema of both hands  -     hydrocortisone 2.5 % cream; Apply thin layer topically 2 times daily. Use Eucerin cream at bedtime and before work. Wear gloves at night to keep the hands warm and to prevent lotion on sheets and pillow cases. Wash hands in luke warm water. Patient Education        Atopic Dermatitis: Care Instructions  Your Care Instructions  Atopic dermatitis (also called eczema) is a skin problem that causes intense itching and a red, raised rash. In severe cases, the rash develops clear fluidfilled blisters. The rash is not contagious. People with this condition seem to have very sensitive immune systems that are likely to react to things that cause allergies. The immune system is the body's way of fighting infection. There is no cure for atopic dermatitis, but you may be able to control it with care at home. Follow-up care is a key part of your treatment and safety. Be sure to make and go to all appointments, and call your doctor if you are having problems. It's also a good idea to know your test results and keep a list of the medicines you take. How can you care for yourself at home? · Use moisturizer at least twice a day. · If your doctor prescribes a cream, use it as directed. If your doctor prescribes other medicine, take it exactly as directed. · Wash the affected area with water only. Soap can make dryness and itching worse. Pat dry. · Apply a moisturizer after bathing. Use a cream such as Lubriderm, Moisturel, or Cetaphil that does not irritate the skin or cause a rash. Apply the cream while your skin is still damp after lightly drying with a towel. · Use cold, wet cloths to reduce itching. · Keep cool, and stay out of the sun.   · If itching affects your normal activities, an over-the-counter antihistamine, such as diphenhydramine (Benadryl) or loratadine (Claritin) may help. Read and follow all instructions on the label. When should you call for help? Call your doctor now or seek immediate medical care if:    · Your rash gets worse and you have a fever.     · You have new blisters or bruises, or the rash spreads and looks like a sunburn.     · You have signs of infection, such as:  ? Increased pain, swelling, warmth, or redness. ? Red streaks leading from the rash. ? Pus draining from the rash. ? A fever.     · You have crusting or oozing sores.     · You have joint aches or body aches along with your rash. Watch closely for changes in your health, and be sure to contact your doctor if:    · Your rash does not clear up after 2 to 3 weeks of home treatment.     · Itching interferes with your sleep or daily activities. Where can you learn more? Go to https://[employer].DepotPoint. org and sign in to your [employer] account. Enter J896 in the [facility] box to learn more about \"Atopic Dermatitis: Care Instructions. \"     If you do not have an account, please click on the \"Sign Up Now\" link. Current as of: July 2, 2020               Content Version: 12.8  © 7674-4074 [employer]. Care instructions adapted under license by [facility] ([facility]). If you have questions about a medical condition or this instruction, always ask your healthcare professional. [place] [address] any warranty or liability for your use of this information. Patient Education        Lymphadenitis: Care Instructions  Your Care Instructions  Lymph nodes are small, bean-shaped glands throughout the body. They help the body fight germs and infections. Lymphadenitis is a swelling of a lymph node. It can be caused by an infection or other condition. The infection is most often in a nearby part of the body.  A common example and sign in to your [employer] account. Enter V999 in the [facility] box to learn more about \"Lymphadenitis: Care Instructions. \"     If you do not have an account, please click on the \"Sign Up Now\" link. Current as of: September 23, 2020               Content Version: 12.8  © 9124-3592 Healthwise[employer]. Care instructions adapted under license by [facility] ([facility]). If you have questions about a medical condition or this instruction, always ask your healthcare professional. [address] any warranty or liability for your use of this information.

## 2021-11-30 ENCOUNTER — OFFICE VISIT (OUTPATIENT)
Dept: FAMILY MEDICINE CLINIC | Age: 21
End: 2021-11-30
Payer: COMMERCIAL

## 2021-11-30 VITALS
HEART RATE: 71 BPM | HEIGHT: 72 IN | OXYGEN SATURATION: 98 % | WEIGHT: 126.2 LBS | BODY MASS INDEX: 17.09 KG/M2 | DIASTOLIC BLOOD PRESSURE: 70 MMHG | SYSTOLIC BLOOD PRESSURE: 110 MMHG | RESPIRATION RATE: 20 BRPM

## 2021-11-30 DIAGNOSIS — R63.6 UNDERWEIGHT: ICD-10-CM

## 2021-11-30 DIAGNOSIS — S93.401A SPRAIN OF RIGHT ANKLE, UNSPECIFIED LIGAMENT, INITIAL ENCOUNTER: ICD-10-CM

## 2021-11-30 DIAGNOSIS — R59.1 LYMPHADENOPATHY OF HEAD AND NECK: Primary | ICD-10-CM

## 2021-11-30 DIAGNOSIS — E55.9 VITAMIN D DEFICIENCY: ICD-10-CM

## 2021-11-30 LAB — VITAMIN D 25-HYDROXY: 36.7 NG/ML

## 2021-11-30 PROCEDURE — 99215 OFFICE O/P EST HI 40 MIN: CPT | Performed by: FAMILY MEDICINE

## 2021-11-30 NOTE — PROGRESS NOTES
Andree Simeon (:  2000) is a 24 y.o. male,Established patient, here for evaluation of the following chief complaint(s): Other (pt c/o lymph nodes are still swollen been that way for a while) and Ankle Pain (pt c/o ankle pain after injuring it playing basketball)       ASSESSMENT/PLAN:  405    Patient Instructions     Alex Marsh was seen today for other and ankle pain. Diagnoses and all orders for this visit:    Lymphadenopathy of head and neck  Don't worry if no-tender and not enlarging. Underweight -prior to mononucleosis  Use protein bar or drink ready when too busy to eat. Sprain of right ankle, unspecified ligament, initial encounter  Range of motion do every 1-2 hours. Moist heat  Sports cream (Aspercreme doesn't smell). Diclofenac (brand name Voltaren) gel 1% is available over-the-counter. You can place 4 g on the knee NEXT TO the kneecap but NOT ON TOP OF the kneecap. If placed on top of the kneecap it will not penetrate into the joint. You can also put some of the 4 g dose on the joint line on each side of the knee going back towards behind the knee. You can put this on up to 4 times a day. Even if you do both joints 4 times a day there is not enough absorbed into the bloodstream to upset the stomach or significantly affect the kidneys. Tylenol 500 mg up to 2 tabs 3x/day as needed. Take 1-2 Aleve twice daily with a full meal for 1-2 weeks (assuming no blood thinners besides a baby aspirin or recent treatment for ulcers). If heartburn develops take omeprazole 20 mg 30 minutes before breakfast routinely every day. This may work better than the Voltaren gel but can cause stomach problems as well as affect the kidneys. It can also cause swelling in the ankles in some people because of its effects on the kidneys.   If diagnosed with arthritis in the past:  Glucosamine 1500 mg/ chondroitin 1200 mg once daily or any combination equaling that dose i.e. 750/600 mg twice daily or 500/400 mg three time daily. This is a daily joint/arthritis supplement without significant side effects. Wear an ankle sleeve. Consider high top tennis shoes. Patient Education   Ankle Sprain: Rehab Exercises  Introduction  Here are some examples of exercises for you to try. The exercises may be suggested for a condition or for rehabilitation. Start each exercise slowly. Ease off the exercises if you start to have pain. You will be told when to start these exercises and which ones will work best for you. How to do the exercises  'Alphabet' exercise    1. Trace the alphabet with your toe. This helps your ankle move in all directions. Side-to-side knee swing exercise    1. Sit in a chair with your foot flat on the floor. 2. Slowly move your knee from side to side. Keep your foot pressed flat. 3. Continue this exercise for 2 to 3 minutes. Towel curl    1. While sitting, place your foot on a towel on the floor. Scrunch the towel toward you with your toes. 2. Then use your toes to push the towel away from you. 3. To make this exercise more challenging you can put something on the other end of the towel. A can of soup is about the right weight for this. Towel stretch    1. Sit with your legs extended and knees straight. 2. Place a towel around your foot just under the toes. 3. Hold each end of the towel in each hand, with your hands above your knees. 4. Pull back with the towel so that your foot stretches toward you. 5. Hold the position for at least 15 to 30 seconds. 6. Repeat 2 to 4 times a session. Do up to 5 sessions a day. Ankle eversion exercise    1. Start by sitting with your foot flat on the floor. Push your foot outward against a wall or a piece of furniture that doesn't move. Hold for about 6 seconds, and relax. Repeat 8 to 12 times. 2. After you feel comfortable with this, try using rubber tubing looped around the outside of your feet for resistance.  Push your foot out to the side against the tubing, and then count to 10 as you slowly bring your foot back to the middle. Repeat 8 to 12 times. Isometric opposition exercises    1. While sitting, put your feet together flat on the floor. 2. Press your injured foot inward against your other foot. Hold for about 6 seconds, and relax. Repeat 8 to 12 times. 3. Then place the heel of your other foot on top of the injured one. Push down with the top heel while trying to push up with your injured foot. Hold for about 6 seconds, and relax. Repeat 8 to 12 times. Resisted ankle inversion    1. Sit on the floor with your good leg crossed over your other leg. 2. Hold both ends of an exercise band and loop the band around the inside of your affected foot. Then press your other foot against the band. 3. Keeping your legs crossed, slowly push your affected foot against the band so that foot moves away from your other foot. Then slowly relax. 4. Repeat 8 to 12 times. Resisted ankle eversion    1. Sit on the floor with your legs straight. 2. Hold both ends of an exercise band and loop the band around the outside of your affected foot. Then press your other foot against the band. 3. Keeping your leg straight, slowly push your affected foot outward against the band and away from your other foot without letting your leg rotate. Then slowly relax. 4. Repeat 8 to 12 times. Resisted ankle dorsiflexion    1. Tie the ends of an exercise band together to form a loop. Attach one end of the loop to a secure object or shut a door on it to hold it in place. (Or you can have someone hold one end of the loop to provide resistance.)  2. While sitting on the floor or in a chair, loop the other end of the band over the top of your affected foot. 3. Keeping your knee and leg straight, slowly flex your foot to pull back on the exercise band, and then slowly relax. 4. Repeat 8 to 12 times. Single-leg balance    1.  Stand on a flat surface with your arms stretched out to your sides like you are making the letter \"T. \" Then lift your good leg off the floor, bending it at the knee. If you are not steady on your feet, use one hand to hold on to a chair, counter, or wall. 2. Standing on the leg with your affected ankle, keep that knee straight. Try to balance on that leg for up to 30 seconds. Then rest for up to 10 seconds. 3. Repeat 6 to 8 times. 4. When you can balance on your affected leg for 30 seconds with your eyes open, try to balance on it with your eyes closed. 5. When you can do this exercise with your eyes closed for 30 seconds and with ease and no pain, try standing on a pillow or piece of foam, and repeat steps 1 through 4. Follow-up care is a key part of your treatment and safety. Be sure to make and go to all appointments, and call your doctor if you are having problems. It's also a good idea to know your test results and keep a list of the medicines you take. Where can you learn more? Go to https://[employer]. org and sign in to your [employer] account. Enter Carmen Bolanos in the [facility] box to learn more about \"Ankle Sprain: Rehab Exercises. \"     If you do not have an account, please click on the \"Sign Up Now\" link. Current as of: July 1, 2021               Content Version: 13.0  © 2006-2021 [employer]. Care instructions adapted under license by [facility] ([facility]). If you have questions about a medical condition or this instruction, always ask your healthcare professional. [place] [address] any warranty or liability for your use of this information. Patient Education   Ankle Sprain: Care Instructions  Your Care Instructions     An ankle sprain can happen when you twist your ankle. The ligaments that support the ankle can get stretched and torn. Often the ankle is swollen and painful.   Ankle sprains may take from several weeks to several months to heal. Usually, the more pain and swelling you have, the more severe your ankle sprain is and the longer it will take to heal. You can heal faster and regain strength in your ankle with good home treatment. It is very important to give your ankle time to heal completely, so that you do not easily hurt your ankle again. Follow-up care is a key part of your treatment and safety. Be sure to make and go to all appointments, and call your doctor if you are having problems. It's also a good idea to know your test results and keep a list of the medicines you take. How can you care for yourself at home? · Prop up your foot on pillows as much as possible for the next 3 days. Try to keep your ankle above the level of your heart. This will help reduce the swelling. · Follow your doctor's directions for wearing a splint or elastic bandage. Wrapping the ankle may help reduce or prevent swelling. · Your doctor may give you a splint, a brace, an air stirrup, or another form of ankle support to protect your ankle until it is healed. Wear it as directed while your ankle is healing. Do not remove it unless your doctor tells you to. After your ankle has healed, ask your doctor whether you should wear the brace when you exercise. · Put ice or cold packs on your injured ankle for 10 to 20 minutes at a time. Try to do this every 1 to 2 hours for the next 3 days (when you are awake) or until the swelling goes down. Put a thin cloth between the ice and your skin. · You may need to use crutches until you can walk without pain. If you do use crutches, try to bear some weight on your injured ankle if you can do so without pain. This helps the ankle heal.  · Take pain medicines exactly as directed. ? If the doctor gave you a prescription medicine for pain, take it as prescribed. ? If you are not taking a prescription pain medicine, ask your doctor if you can take an over-the-counter medicine. · If you have been given ankle exercises to do at home, do them exactly as instructed.  These can promote healing and help prevent lasting weakness. When should you call for help? Call your doctor now or seek immediate medical care if:    · Your pain is getting worse.     · Your swelling is getting worse.     · Your splint feels too tight or you are unable to loosen it. Watch closely for changes in your health, and be sure to contact your doctor if:    · You are not getting better after 1 week. Where can you learn more? Go to https://web care [employer].To8to. org and sign in to your [employer] account. Enter H303 in the 72798.com box to learn more about \"Ankle Sprain: Care Instructions. \"     If you do not have an account, please click on the \"Sign Up Now\" link. Current as of: July 1, 2021               Content Version: 13.0  © 2006-2021 [employer]. Care instructions adapted under license by [facility] ([facility]). If you have questions about a medical condition or this instruction, always ask your healthcare professional. [place] [address] any warranty or liability for your use of this information. Return if symptoms worsen or fail to improve. Subjective   SUBJECTIVE/OBJECTIVE:   Chief Complaint   Patient presents with    Other     pt c/o lymph nodes are still swollen been that way for a while    Ankle Pain     pt c/o ankle pain after injuring it playing basketball     321  HPI  LN  Tires more easily since mono. Node in front of left ear unchanged. Not tender. When gets a cold gets a cold submandibular nodes become tender. If cough sneezing runny nose. Right ankle pain  In august injured playing basketball. Not swollen but iced it and helped initially. No meds/heat/topicals. Worse when sits too long or uses too much. Hard to play basket ball iif uses too much. No brace sleeve. Underweight  He got up to 130 lbs at 1 point but is busy. Not doing supplements if busy.  Gets light headed when stands too fast. Drinks 2 bottles of Gatorade 32 oz and 1 bottle of water Sports. 15 push ups and sit -ups bid. Plays basketball. Plays volley ball 2x/wk for 2.5 hr. 15 push ups and sit -ups qhs.12/19/18. None now. 2/15/21. He walks around track 1/4 mi 3x/wk. Volley ball 2x/wk x 1 hr.3/11/21. Track 2x/wk. Volley ball 2x/wk x 1 hr. 4/23/21     Social Determinants of Health     Financial Resource Strain: Low Risk     Difficulty of Paying Living Expenses: Not hard at all   Food Insecurity: No Food Insecurity    Worried About Running Out of Food in the Last Year: Never true    Fidelia of Food in the Last Year: Never true   Transportation Needs: No Transportation Needs    Lack of Transportation (Medical): No    Lack of Transportation (Non-Medical):  No   Physical Activity:     Days of Exercise per Week: Not on file    Minutes of Exercise per Session: Not on file   Stress:     Feeling of Stress : Not on file   Social Connections:     Frequency of Communication with Friends and Family: Not on file    Frequency of Social Gatherings with Friends and Family: Not on file    Attends Sikhism Services: Not on file    Active Member of Clubs or Organizations: Not on file    Attends Club or Organization Meetings: Not on file    Marital Status: Not on file   Intimate Partner Violence:     Fear of Current or Ex-Partner: Not on file    Emotionally Abused: Not on file    Physically Abused: Not on file    Sexually Abused: Not on file   Housing Stability:     Unable to Pay for Housing in the Last Year: Not on file    Number of [place] in the Last Year: Not on file    Unstable Housing in the Last Year: Not on file       Family History   Problem Relation Age of Onset    Asthma Mother     Allergic Rhinitis Mother     Eczema Mother     Osteoarthritis Mother         knees    Thyroid Disease Mother         high TSH    Other Mother         overweight, recurring childhood OM    Liver Disease Father         fatty liver/ high LFTs    Other Father         metabolic synd, GERD    Kidney Disease Father         stones    High Cholesterol Father         low HDL    Asthma Father     Diabetes Paternal Grandmother     Kidney Disease Paternal Grandmother         stones    Diabetes Maternal Grandmother     Hypertension Maternal Grandmother     Other Brother         ureathral dilation, Marfans w/u - NEGATIVE after ? pneumothorax?, recur OM till 3 y/o    Other Brother         speech problems, behav issues    Heart Defect Paternal Uncle     Other Paternal Grandfather         cataracts, methothelioma    Clotting Disorder Paternal Grandfather         DVT    Allergic Rhinitis Paternal Aunt     Allergic Rhinitis Maternal Aunt     Alcohol Abuse Maternal Uncle     No Known Problems Brother        No Known Allergies    Current Outpatient Medications   Medication Sig Dispense Refill    MULTIPLE VITAMIN PO Take 1 tablet by mouth daily      Ascorbic Acid (VITAMIN C) 500 MG CAPS Take 1 capsule by mouth daily      Cholecalciferol (VITAMIN D3) 50 MCG (2000 UT) CAPS Take 1 capsule by mouth daily       No current facility-administered medications for this visit. Objective   Physical Exam  HENT:      Head:        Mouth/Throat:      Mouth: Mucous membranes are moist.      Pharynx: Posterior oropharyngeal erythema present. No pharyngeal swelling, oropharyngeal exudate or uvula swelling. Tonsils: No tonsillar exudate or tonsillar abscesses. Chest:   Breasts:      Right: No axillary adenopathy or supraclavicular adenopathy. Left: No axillary adenopathy or supraclavicular adenopathy. Musculoskeletal:      Right ankle: Normal. No swelling, deformity or ecchymosis. No tenderness. Normal range of motion. Comments: Right ankle ROM: only pain is with external rotation of the foot at the ankle (not eversion). All other normal.   Lymphadenopathy:      Head:      Right side of head: No submental, submandibular, tonsillar, preauricular, posterior auricular or occipital adenopathy.       Left side of head: Preauricular adenopathy present. No submental, submandibular, tonsillar, posterior auricular or occipital adenopathy. Cervical: Cervical adenopathy present. Right cervical: No superficial, deep or posterior cervical adenopathy. Left cervical: No superficial, deep or posterior cervical adenopathy. Upper Body:      Right upper body: No supraclavicular, axillary or pectoral adenopathy. Left upper body: No supraclavicular, axillary or pectoral adenopathy. Lower Body: No right inguinal adenopathy. No left inguinal adenopathy. Vitals:    11/30/21 1452   BP: 110/70   Site: Right Upper Arm   Position: Sitting   Cuff Size: Small Adult   Pulse: 71   Resp: 20   SpO2: 98%   Weight: 126 lb 3.2 oz (57.2 kg)   Height: 6' (1.829 m)     BP Readings from Last 3 Encounters:   11/30/21 110/70   04/13/21 116/76   03/11/21 102/60     Pulse Readings from Last 3 Encounters:   11/30/21 71   04/13/21 75   03/11/21 90     Wt Readings from Last 3 Encounters:   11/30/21 126 lb 3.2 oz (57.2 kg)   04/13/21 125 lb (56.7 kg)   03/11/21 122 lb 3.2 oz (55.4 kg)     Body mass index is 17.12 kg/m². On this date 11/30/2021 I have spent 44 minutes reviewing previous notes, test results and face to face with the patient discussing the diagnosis and importance of compliance with the treatment plan as well as documenting on the day of the visit. An electronic signature was used to authenticate this note.     --Lauren Gilliland,

## 2021-11-30 NOTE — PATIENT INSTRUCTIONS
Amadeo Gil was seen today for other and ankle pain. Diagnoses and all orders for this visit:    Lymphadenopathy of head and neck  Don't worry if no-tender and not enlarging. Underweight -prior to mononucleosis  Use protein bar or drink ready when too busy to eat. Sprain of right ankle, unspecified ligament, initial encounter  Range of motion do every 1-2 hours. Moist heat  Sports cream (Aspercreme doesn't smell). Diclofenac (brand name Voltaren) gel 1% is available over-the-counter. You can place 4 g on the knee NEXT TO the kneecap but NOT ON TOP OF the kneecap. If placed on top of the kneecap it will not penetrate into the joint. You can also put some of the 4 g dose on the joint line on each side of the knee going back towards behind the knee. You can put this on up to 4 times a day. Even if you do both joints 4 times a day there is not enough absorbed into the bloodstream to upset the stomach or significantly affect the kidneys. Tylenol 500 mg up to 2 tabs 3x/day as needed. Take 1-2 Aleve twice daily with a full meal for 1-2 weeks (assuming no blood thinners besides a baby aspirin or recent treatment for ulcers). If heartburn develops take omeprazole 20 mg 30 minutes before breakfast routinely every day. This may work better than the Voltaren gel but can cause stomach problems as well as affect the kidneys. It can also cause swelling in the ankles in some people because of its effects on the kidneys. If diagnosed with arthritis in the past:  Glucosamine 1500 mg/ chondroitin 1200 mg once daily or any combination equaling that dose i.e. 750/600 mg twice daily or 500/400 mg three time daily. This is a daily joint/arthritis supplement without significant side effects. Wear an ankle sleeve. Consider high top tennis shoes. Patient Education   Ankle Sprain: Rehab Exercises  Introduction  Here are some examples of exercises for you to try.  The exercises may be suggested for a condition or for rehabilitation. Start each exercise slowly. Ease off the exercises if you start to have pain. You will be told when to start these exercises and which ones will work best for you. How to do the exercises  'Alphabet' exercise    1. Trace the alphabet with your toe. This helps your ankle move in all directions. Side-to-side knee swing exercise    1. Sit in a chair with your foot flat on the floor. 2. Slowly move your knee from side to side. Keep your foot pressed flat. 3. Continue this exercise for 2 to 3 minutes. Towel curl    1. While sitting, place your foot on a towel on the floor. Scrunch the towel toward you with your toes. 2. Then use your toes to push the towel away from you. 3. To make this exercise more challenging you can put something on the other end of the towel. A can of soup is about the right weight for this. Towel stretch    1. Sit with your legs extended and knees straight. 2. Place a towel around your foot just under the toes. 3. Hold each end of the towel in each hand, with your hands above your knees. 4. Pull back with the towel so that your foot stretches toward you. 5. Hold the position for at least 15 to 30 seconds. 6. Repeat 2 to 4 times a session. Do up to 5 sessions a day. Ankle eversion exercise    1. Start by sitting with your foot flat on the floor. Push your foot outward against a wall or a piece of furniture that doesn't move. Hold for about 6 seconds, and relax. Repeat 8 to 12 times. 2. After you feel comfortable with this, try using rubber tubing looped around the outside of your feet for resistance. Push your foot out to the side against the tubing, and then count to 10 as you slowly bring your foot back to the middle. Repeat 8 to 12 times. Isometric opposition exercises    1. While sitting, put your feet together flat on the floor. 2. Press your injured foot inward against your other foot. Hold for about 6 seconds, and relax. Repeat 8 to 12 times.   3. Then place the heel of your other foot on top of the injured one. Push down with the top heel while trying to push up with your injured foot. Hold for about 6 seconds, and relax. Repeat 8 to 12 times. Resisted ankle inversion    1. Sit on the floor with your good leg crossed over your other leg. 2. Hold both ends of an exercise band and loop the band around the inside of your affected foot. Then press your other foot against the band. 3. Keeping your legs crossed, slowly push your affected foot against the band so that foot moves away from your other foot. Then slowly relax. 4. Repeat 8 to 12 times. Resisted ankle eversion    1. Sit on the floor with your legs straight. 2. Hold both ends of an exercise band and loop the band around the outside of your affected foot. Then press your other foot against the band. 3. Keeping your leg straight, slowly push your affected foot outward against the band and away from your other foot without letting your leg rotate. Then slowly relax. 4. Repeat 8 to 12 times. Resisted ankle dorsiflexion    1. Tie the ends of an exercise band together to form a loop. Attach one end of the loop to a secure object or shut a door on it to hold it in place. (Or you can have someone hold one end of the loop to provide resistance.)  2. While sitting on the floor or in a chair, loop the other end of the band over the top of your affected foot. 3. Keeping your knee and leg straight, slowly flex your foot to pull back on the exercise band, and then slowly relax. 4. Repeat 8 to 12 times. Single-leg balance    1. Stand on a flat surface with your arms stretched out to your sides like you are making the letter \"T. \" Then lift your good leg off the floor, bending it at the knee. If you are not steady on your feet, use one hand to hold on to a chair, counter, or wall. 2. Standing on the leg with your affected ankle, keep that knee straight. Try to balance on that leg for up to 30 seconds.  Then rest for up to 10 seconds. 3. Repeat 6 to 8 times. 4. When you can balance on your affected leg for 30 seconds with your eyes open, try to balance on it with your eyes closed. 5. When you can do this exercise with your eyes closed for 30 seconds and with ease and no pain, try standing on a pillow or piece of foam, and repeat steps 1 through 4. Follow-up care is a key part of your treatment and safety. Be sure to make and go to all appointments, and call your doctor if you are having problems. It's also a good idea to know your test results and keep a list of the medicines you take. Where can you learn more? Go to https://[email or web address].[email or web address] org and sign in to your Pyreg account. Enter Peter Steen in the Semantics3 box to learn more about \"Ankle Sprain: Rehab Exercises. \"     If you do not have an account, please click on the \"Sign Up Now\" link. Current as of: July 1, 2021               Content Version: 13.0  © 2006-2021 [employer]. Care instructions adapted under license by [facility] ([facility]). If you have questions about a medical condition or this instruction, always ask your healthcare professional. [place] [address] any warranty or liability for your use of this information. Patient Education        Ankle Sprain: Care Instructions  Your Care Instructions     An ankle sprain can happen when you twist your ankle. The ligaments that support the ankle can get stretched and torn. Often the ankle is swollen and painful. Ankle sprains may take from several weeks to several months to heal. Usually, the more pain and swelling you have, the more severe your ankle sprain is and the longer it will take to heal. You can heal faster and regain strength in your ankle with good home treatment. It is very important to give your ankle time to heal completely, so that you do not easily hurt your ankle again. Follow-up care is a key part of your treatment and safety.  Be sure to make and go to all appointments, and call your doctor if you are having problems. It's also a good idea to know your test results and keep a list of the medicines you take. How can you care for yourself at home? · Prop up your foot on pillows as much as possible for the next 3 days. Try to keep your ankle above the level of your heart. This will help reduce the swelling. · Follow your doctor's directions for wearing a splint or elastic bandage. Wrapping the ankle may help reduce or prevent swelling. · Your doctor may give you a splint, a brace, an air stirrup, or another form of ankle support to protect your ankle until it is healed. Wear it as directed while your ankle is healing. Do not remove it unless your doctor tells you to. After your ankle has healed, ask your doctor whether you should wear the brace when you exercise. · Put ice or cold packs on your injured ankle for 10 to 20 minutes at a time. Try to do this every 1 to 2 hours for the next 3 days (when you are awake) or until the swelling goes down. Put a thin cloth between the ice and your skin. · You may need to use crutches until you can walk without pain. If you do use crutches, try to bear some weight on your injured ankle if you can do so without pain. This helps the ankle heal.  · Take pain medicines exactly as directed. ? If the doctor gave you a prescription medicine for pain, take it as prescribed. ? If you are not taking a prescription pain medicine, ask your doctor if you can take an over-the-counter medicine. · If you have been given ankle exercises to do at home, do them exactly as instructed. These can promote healing and help prevent lasting weakness. When should you call for help? Call your doctor now or seek immediate medical care if:    · Your pain is getting worse.     · Your swelling is getting worse.     · Your splint feels too tight or you are unable to loosen it.    Watch closely for changes in your health, and be sure to contact your doctor if:    · You are not getting better after 1 week. Where can you learn more? Go to https://chpepiceweb.[email or web address] org and sign in to your Granular account. Enter X734 in the [employer] box to learn more about \"Ankle Sprain: Care Instructions. \"     If you do not have an account, please click on the \"Sign Up Now\" link. Current as of: July 1, 2021               Content Version: 13.0  © 2006-2021 Healthwise, Incorporated. Care instructions adapted under license by [facility] ([facility]). If you have questions about a medical condition or this instruction, always ask your healthcare professional. [address] any warranty or liability for your use of this information.

## 2022-03-29 ENCOUNTER — OFFICE VISIT (OUTPATIENT)
Dept: FAMILY MEDICINE CLINIC | Age: 22
End: 2022-03-29
Payer: COMMERCIAL

## 2022-03-29 VITALS
SYSTOLIC BLOOD PRESSURE: 114 MMHG | DIASTOLIC BLOOD PRESSURE: 74 MMHG | HEART RATE: 80 BPM | WEIGHT: 122 LBS | BODY MASS INDEX: 16.52 KG/M2 | OXYGEN SATURATION: 98 % | RESPIRATION RATE: 20 BRPM | HEIGHT: 72 IN

## 2022-03-29 DIAGNOSIS — L85.3 DRY SKIN: Primary | ICD-10-CM

## 2022-03-29 PROCEDURE — 99213 OFFICE O/P EST LOW 20 MIN: CPT | Performed by: FAMILY MEDICINE

## 2022-03-29 ASSESSMENT — PATIENT HEALTH QUESTIONNAIRE - PHQ9
SUM OF ALL RESPONSES TO PHQ QUESTIONS 1-9: 0
SUM OF ALL RESPONSES TO PHQ9 QUESTIONS 1 & 2: 0
1. LITTLE INTEREST OR PLEASURE IN DOING THINGS: 0
2. FEELING DOWN, DEPRESSED OR HOPELESS: 0

## 2022-03-29 NOTE — PROGRESS NOTES
Quintin Eng (:  2000) is a 25 y.o. male,Established patient, here for evaluation of the following chief complaint(s):  Rash (pt c/o rash on his chest and inner thighs x 2 weeks has tried hydrocortisone cream with no relief )       ASSESSMENT/PLAN:  436    Patient Instructions     GENERAL OFFICE Jeff Espino was seen today for rash. Diagnoses and all orders for this visit:    Dry skin  Trial of A & D ointment to area of rash and or itch. Luke warm showers. Use less soap. Trial of:  Every other day soap except face, groin, anal area, armpits every day. Patient Education   Dry Skin: Care Instructions  Your Care Instructions  Dry skin is a common problem, especially in areas where the air is very dry. Dry skin can also become a problem as you get older and lose natural oils thatkeep your skin moist.  A tendency toward dry, itchy skin may run in families. Some problems with the body's defenses (immune system), allergies, or an infection with a fungus mayalso cause patches of dry skin. An over-the-counter cream may help your dry skin. If your skin problem does not get better with home treatment, your doctor may prescribe ointment. You mayneed antibiotics if you have a skin infection. Follow-up care is a key part of your treatment and safety. Be sure to make and go to all appointments, and call your doctor if you are having problems. It's also a good idea to know your test results and keep alist of the medicines you take. How can you care for yourself at home? Showers and baths   Keep showers and baths short, and use warm or lukewarm water. Don't use hot water. It takes off more of your skin's natural oils.  Choose a mild skin cleanser like Aquanil or Cetaphil.  If you are taking a bath, use a skin cleanser at the very end. Then rinse off with fresh water. Gently pat your skin dry with a towel.   Skin creams and moisturizers   Apply moisturizer or skin cream right away (within 3 minutes) after a bath or shower. Use a moisturizer at other times too, as often as you need it.  Moisturizing creams are better than lotions. Try brands like CeraVe cream, Cetaphil cream, or Eucerin cream.  Other tips   When washing clothes, use a small amount of detergent. Don't use fabric softeners or dryer sheets.  For small areas of itchy skin, try an over-the-counter 1% hydrocortisone cream.   If you have very dry hands, spread petroleum jelly (such as Vaseline) on your hands before bed. Wear thin cotton gloves while you sleep. If your feet are dry, spread Vaseline on them and wear socks while you sleep. When should you call for help? Call your doctor now or seek immediate medical care if:     You have signs of infection, such as:  ? Pain, warmth, or swelling in the skin. ? Red streaks near a wound in the skin. ? Pus coming from a wound in your skin. ? A fever. Watch closely for changes in your health, and be sure to contact your doctor if:     You do not get better as expected. Where can you learn more? Go to https://[email or web address] [email or web address] [email or web address]/Oeweb.[email or web address] org and sign in to your Sedicidodici account. Enter M517 in the LegalGuru box to learn more about \"Dry Skin: Care Instructions. \"     If you do not have an account, please click on the \"Sign Up Now\" link. Current as of: November 15, 2021               Content Version: 13.2  © 2006-2022 Healthwise, [facility]. Care instructions adapted under license by [facility] ([facility]). If you have questions about a medical condition or this instruction, always ask your healthcare professional. [place] [address] any warranty or liability for your use of this information. Return if symptoms worsen or fail to improve.        Subjective   SUBJECTIVE/OBJECTIVE:  Chief Complaint   Patient presents with    Rash     pt c/o rash on his chest and inner thighs x 2 weeks has tried hydrocortisone cream with no relief    413  HPI  Rash  For 2 weeks that started on chest and went to inner thigh. The right inner thigh feels itchy. He also has patch on his left upper arm. He did take hot showers. He has cut back on doing so. He does use a wash rag. Vitamin D  Ran out at the beginning of the year. Needs to purchase more. 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    Cellulitis of left knee, lateral 01/2019    Txed at [address]    Hand, foot and mouth disease 01/01/2001    Hayfever     Mononucleosis, infectious, with hepatitis 2/4/2021    Near sighted 2011       Past Surgical History:   Procedure Laterality Date    CIRCUMCISION N/A 03/2000    WISDOM TOOTH EXTRACTION Bilateral 06/2021    all 4       Social History     Socioeconomic History    Marital status: Single     Spouse name: Not on file    Number of children: Not on file    Years of education: 15.5    Highest education level: Not on file   Occupational History    Occupation: [employer] file[employer]     Comment: 35/2 wk    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  Drug use: No    Sexual activity: Yes     Partners: Female     Birth control/protection: Pill     Comment: no condums in 1 relationship. Other Topics Concern    Not on file   Social History Narrative    Social History: School: [employer], School Performance: - mostly A's and B's. Likes social studies. Collects model cars.   Has a dog and 4 gold fish. Patient Lives with: parent(s). No Sports. 15 push ups and sit -ups bid. Plays basketball. Plays volley ball 2x/wk for 2.5 hr. 15 push ups and sit -ups qhs.12/19/18. None now. 2/15/21. He walks around track 1/4 mi 3x/wk. Volley ball 2x/wk x 1 hr.3/11/21. Track 2x/wk. Volley ball 2x/wk x 1 hr. 4/23/21. Plays basketball 4-5hrs/wk. 15 push ups and sit -ups qhs.11/30/21. Social Determinants of Health     Financial Resource Strain:     Difficulty of Paying Living Expenses: Not on file   Food Insecurity:     Worried About Running Out of Food in the Last Year: Not on file    Fidelia of Food in the Last Year: Not on file   Transportation Needs:     Lack of Transportation (Medical): Not on file    Lack of Transportation (Non-Medical):  Not on file   Physical Activity:     Days of Exercise per Week: Not on file    Minutes of Exercise per Session: Not on file   Stress:     Feeling of Stress : Not on file   Social Connections:     Frequency of Communication with Friends and Family: Not on file    Frequency of Social Gatherings with Friends and Family: Not on file    Attends Hoahaoism Services: Not on file    Active Member of Clubs or Organizations: Not on file    Attends Club or Organization Meetings: Not on file    Marital Status: Not on file   Intimate Partner Violence:     Fear of Current or Ex-Partner: Not on file    Emotionally Abused: Not on file    Physically Abused: Not on file    Sexually Abused: Not on file   Housing Stability:     Unable to Pay for Housing in the Last Year: Not on file    Number of [place] in the Last Year: Not on file    Unstable Housing in the Last Year: Not on file       Family History   Problem Relation Age of Onset    Asthma Mother     Allergic Rhinitis Mother     Eczema Mother     Osteoarthritis Mother         knees    Thyroid Disease Mother         high TSH    Other Mother         overweight, recurring childhood OM    Liver Disease Father         fatty liver/ high LFTs    Other Father         metabolic synd, GERD    Kidney Disease Father         stones    High Cholesterol Father         low HDL    Asthma Father     Diabetes Paternal Grandmother     Kidney Disease Paternal Grandmother         stones    Diabetes Maternal Grandmother     Hypertension Maternal Grandmother     Other Brother         ureathral dilation, Marfans w/u - NEGATIVE after ? pneumothorax?, recur OM till 3 y/o    Other Brother         speech problems, behav issues    Heart Defect Paternal Uncle     Other Paternal Grandfather         cataracts, methothelioma    Clotting Disorder Paternal Grandfather         DVT    Allergic Rhinitis Paternal Aunt     Allergic Rhinitis Maternal Aunt     Alcohol Abuse Maternal Uncle     No Known Problems Brother        No Known Allergies    No current outpatient medications on file. No current facility-administered medications for this visit. Objective    Vitals:    03/29/22 1520   BP: 114/74   Site: Right Upper Arm   Position: Sitting   Cuff Size: Small Adult   Pulse: 80   Resp: 20   SpO2: 98%   Weight: 122 lb (55.3 kg)   Height: 6' (1.829 m)     BP Readings from Last 3 Encounters:   03/29/22 114/74   11/30/21 110/70   04/13/21 116/76     Pulse Readings from Last 3 Encounters:   03/29/22 80   11/30/21 71   04/13/21 75     Wt Readings from Last 3 Encounters:   03/29/22 122 lb (55.3 kg)   11/30/21 126 lb 3.2 oz (57.2 kg)   04/13/21 125 lb (56.7 kg)     Body mass index is 16.55 kg/m². Physical Exam  Vitals and nursing note reviewed. Constitutional:       General: He is not in acute distress. Appearance: Normal appearance. He is well-developed, well-groomed and underweight. He is not ill-appearing, toxic-appearing or diaphoretic. Eyes:      General: No scleral icterus. Right eye: No discharge. Left eye: No discharge.    Neck:      Trachea: Phonation normal.   Skin:     General: Skin is warm and dry.      Coloration: Skin is pale ( Minimally). Skin is not ashen, cyanotic, jaundiced, mottled or sallow. Findings: Rash present. No abrasion, abscess, bruising, ecchymosis, signs of injury, lesion, petechiae or wound. Rash is papular. Rash is not crusting, macular, nodular, purpuric, pustular, scaling, urticarial or vesicular. Nails: There is no clubbing. Neurological:      Mental Status: He is alert. Psychiatric:         Attention and Perception: Attention and perception normal.         Mood and Affect: Mood and affect normal.         Speech: Speech normal.         Behavior: Behavior normal. Behavior is cooperative. Cognition and Memory: Cognition and memory normal.         Judgment: Judgment normal.        Ref. Range 11/30/2021 16:08   Vit D, 25-Hydroxy  >=30 ng/mL 36.7      On this date 3/29/2022 I have spent 23 minutes reviewing previous notes, test results and face to face with the patient discussing the diagnosis and importance of compliance with the treatment plan as well as documenting on the day of the visit. An electronic signature was used to authenticate this note.     --Suzan Marroquin, DO

## 2022-03-29 NOTE — PATIENT INSTRUCTIONS
GENERAL OFFICE POLICIES      Telephone Calls: Messages will be answered within 1-2 business days, unless the provider is out of the office. If it is urgent a covering provider will answer. (this does not include Medication refills). MyChart: We recommend all patients sign up for [employer]. Through this portal you can see your lab results, request refills, schedule appointments, pay your bill and send messages to the office. [employer] messages will be answered within 1-2 business days unless the provider is out of the office. For urgent matters, please call the office. Appointments:  All appointments must be scheduled. We ask all patients to schedule their next follow up appointment before they leave the office to make sure you will be able to be seen before you run out of medications. 24 hours notice is required to cancel or reschedule an appointment to avoid being marked as a no show. You may be dismissed from the practice after 3 no shows. LATE for Appointment: If you are 15 or more minutes late for your appointment, you may be asked to reschedule. MA/LAB APPTS: Must be scheduled, cannot accept walk in lab visits. We only draw labs for patients established in our office. We only do injections for medications ordered by our office. Acute Sick Visits:  Nothing other than acute complaint will be addressed at this visit. TRADITIONAL MEDICARE  DOES NOT COVER PHYSICALS  MEDICARE WELLNESS VISITS: These are NOT physicals but the free annual visit offered by Medicare to discuss wellness issues. Medication refills, checkups, etc. will not be addressed during this visit. Medication Refills: Refills are handled electronically so please contact your pharmacy for medication refills even if current refills have been exhausted. If you are on a controlled medication you will be referred to a specialist (pain specialist, psychiatry, etc). Forms:  There is a $35 fee to fill out FMLA/Disability paperwork, payable at time of . Instead of the fee, you can choose to have the paperwork filled out during a separate office visit that is for filling out the paperwork only. Medication Samples: This office does not carry medication samples. If you need assistance in getting your medications, then please let the medical assistant know so they can help you sign up for a drug assistance program that can help get medications at a reduced cost or even free (if you qualify). Workman's Comp Claims: We do not handle workman's comp cases or claims. You will need to go to an urgent care to be seen or to whomever your employer uses. General  Any abusive/rude behavior toward staff/providers may be cause for dismissal.    Zee Duran was seen today for rash. Diagnoses and all orders for this visit:    Dry skin  Trial of A & D ointment to area of rash and or itch. Luke warm showers. Use less soap. Trial of:  Every other day soap except face, groin, anal area, armpits every days. Patient Education   Dry Skin: Care Instructions  Your Care Instructions  Dry skin is a common problem, especially in areas where the air is very dry. Dry skin can also become a problem as you get older and lose natural oils thatkeep your skin moist.  A tendency toward dry, itchy skin may run in families. Some problems with the body's defenses (immune system), allergies, or an infection with a fungus mayalso cause patches of dry skin. An over-the-counter cream may help your dry skin. If your skin problem does not get better with home treatment, your doctor may prescribe ointment. You mayneed antibiotics if you have a skin infection. Follow-up care is a key part of your treatment and safety. Be sure to make and go to all appointments, and call your doctor if you are having problems. It's also a good idea to know your test results and keep alist of the medicines you take. How can you care for yourself at home?   Showers and baths   Keep showers and baths short, and use warm or lukewarm water. Don't use hot water. It takes off more of your skin's natural oils.  Choose a mild skin cleanser like Aquanil or Cetaphil.  If you are taking a bath, use a skin cleanser at the very end. Then rinse off with fresh water. Gently pat your skin dry with a towel. Skin creams and moisturizers   Apply moisturizer or skin cream right away (within 3 minutes) after a bath or shower. Use a moisturizer at other times too, as often as you need it.  Moisturizing creams are better than lotions. Try brands like CeraVe cream, Cetaphil cream, or Eucerin cream.  Other tips   When washing clothes, use a small amount of detergent. Don't use fabric softeners or dryer sheets.  For small areas of itchy skin, try an over-the-counter 1% hydrocortisone cream.   If you have very dry hands, spread petroleum jelly (such as Vaseline) on your hands before bed. Wear thin cotton gloves while you sleep. If your feet are dry, spread Vaseline on them and wear socks while you sleep. When should you call for help? Call your doctor now or seek immediate medical care if:     You have signs of infection, such as:  ? Pain, warmth, or swelling in the skin. ? Red streaks near a wound in the skin. ? Pus coming from a wound in your skin. ? A fever. Watch closely for changes in your health, and be sure to contact your doctor if:     You do not get better as expected. Where can you learn more? Go to [email or web address] and sign in to your [employer] account. Enter Y725 in the [facility] box to learn more about \"Dry Skin: Care Instructions. \"     If you do not have an account, please click on the \"Sign Up Now\" link. Current as of: November 15, 2021               Content Version: 13.2  © 0847-3844 [employer]wise, [employer]orporated. Care instructions adapted under license by [facility] ([facility]).  If you have questions about a medical condition or this instruction, always ask your healthcare professional. [address] any warranty or liability for your use of this information.

## 2024-07-03 ENCOUNTER — OFFICE VISIT (OUTPATIENT)
Dept: FAMILY MEDICINE CLINIC | Age: 24
End: 2024-07-03
Payer: COMMERCIAL

## 2024-07-03 VITALS
WEIGHT: 124 LBS | SYSTOLIC BLOOD PRESSURE: 110 MMHG | BODY MASS INDEX: 16.8 KG/M2 | DIASTOLIC BLOOD PRESSURE: 70 MMHG | HEIGHT: 72 IN | HEART RATE: 91 BPM | OXYGEN SATURATION: 94 %

## 2024-07-03 DIAGNOSIS — K90.9 STEATORRHEA: ICD-10-CM

## 2024-07-03 DIAGNOSIS — H10.13 ACUTE ALLERGIC CONJUNCTIVITIS OF BOTH EYES: Primary | ICD-10-CM

## 2024-07-03 LAB
ALBUMIN SERPL-MCNC: 5 G/DL (ref 3.4–5)
ALBUMIN/GLOB SERPL: 2 {RATIO} (ref 1.1–2.2)
ALP SERPL-CCNC: 100 U/L (ref 40–129)
ALT SERPL-CCNC: 12 U/L (ref 10–40)
ANION GAP SERPL CALCULATED.3IONS-SCNC: 14 MMOL/L (ref 3–16)
AST SERPL-CCNC: 20 U/L (ref 15–37)
BILIRUB SERPL-MCNC: 0.9 MG/DL (ref 0–1)
BUN SERPL-MCNC: 13 MG/DL (ref 7–20)
CALCIUM SERPL-MCNC: 9.4 MG/DL (ref 8.3–10.6)
CHLORIDE SERPL-SCNC: 103 MMOL/L (ref 99–110)
CO2 SERPL-SCNC: 25 MMOL/L (ref 21–32)
CREAT SERPL-MCNC: 1 MG/DL (ref 0.9–1.3)
GFR SERPLBLD CREATININE-BSD FMLA CKD-EPI: >90 ML/MIN/{1.73_M2}
GLUCOSE SERPL-MCNC: 77 MG/DL (ref 70–99)
LIPASE SERPL-CCNC: 39 U/L (ref 13–60)
POTASSIUM SERPL-SCNC: 4.4 MMOL/L (ref 3.5–5.1)
PROT SERPL-MCNC: 7.5 G/DL (ref 6.4–8.2)
SODIUM SERPL-SCNC: 142 MMOL/L (ref 136–145)

## 2024-07-03 PROCEDURE — 99213 OFFICE O/P EST LOW 20 MIN: CPT | Performed by: NURSE PRACTITIONER

## 2024-07-03 SDOH — ECONOMIC STABILITY: HOUSING INSECURITY
IN THE LAST 12 MONTHS, WAS THERE A TIME WHEN YOU DID NOT HAVE A STEADY PLACE TO SLEEP OR SLEPT IN A SHELTER (INCLUDING NOW)?: NO

## 2024-07-03 SDOH — ECONOMIC STABILITY: FOOD INSECURITY: WITHIN THE PAST 12 MONTHS, THE FOOD YOU BOUGHT JUST DIDN'T LAST AND YOU DIDN'T HAVE MONEY TO GET MORE.: NEVER TRUE

## 2024-07-03 SDOH — ECONOMIC STABILITY: FOOD INSECURITY: WITHIN THE PAST 12 MONTHS, YOU WORRIED THAT YOUR FOOD WOULD RUN OUT BEFORE YOU GOT MONEY TO BUY MORE.: NEVER TRUE

## 2024-07-03 SDOH — ECONOMIC STABILITY: INCOME INSECURITY: HOW HARD IS IT FOR YOU TO PAY FOR THE VERY BASICS LIKE FOOD, HOUSING, MEDICAL CARE, AND HEATING?: NOT HARD AT ALL

## 2024-07-03 ASSESSMENT — ENCOUNTER SYMPTOMS
EYE ITCHING: 0
SORE THROAT: 0
ABDOMINAL DISTENTION: 0
NAUSEA: 0
RHINORRHEA: 0
SINUS PAIN: 0
CONSTIPATION: 0
SHORTNESS OF BREATH: 0
VOMITING: 0
SINUS PRESSURE: 0
EYE DISCHARGE: 0
ABDOMINAL PAIN: 1
COUGH: 0
PHOTOPHOBIA: 0
EYE PAIN: 0
DIARRHEA: 0
WHEEZING: 0
EYE REDNESS: 1

## 2024-07-03 ASSESSMENT — PATIENT HEALTH QUESTIONNAIRE - PHQ9
SUM OF ALL RESPONSES TO PHQ QUESTIONS 1-9: 0
SUM OF ALL RESPONSES TO PHQ9 QUESTIONS 1 & 2: 0
1. LITTLE INTEREST OR PLEASURE IN DOING THINGS: NOT AT ALL
2. FEELING DOWN, DEPRESSED OR HOPELESS: NOT AT ALL
SUM OF ALL RESPONSES TO PHQ QUESTIONS 1-9: 0

## 2024-07-03 NOTE — PROGRESS NOTES
FASTING LABS DRAWN RA/BRENDA  
no mass.      Tenderness: There is no abdominal tenderness. There is no guarding or rebound.      Hernia: No hernia is present.   Neurological:      Mental Status: He is alert and oriented to person, place, and time.   Psychiatric:         Mood and Affect: Mood normal.         Behavior: Behavior normal.         Thought Content: Thought content normal.         Judgment: Judgment normal.               This dictation was generated by voice recognition computer software.  Although all attempts are made to edit the dictation for accuracy, there may be errors in the transcription that are not intended.    An electronic signature was used to authenticate this note.    --BELEN Rodriguez - CNP

## 2024-07-03 NOTE — PATIENT INSTRUCTIONS
You may receive a survey regarding the care you received during your visit.  Your input is valuable to us.  We encourage you to complete and return your survey.  We hope you will choose us in the future for your healthcare needs. GENERAL OFFICE POLICIES      Telephone Calls: Messages will be answered within 1-2 business days, unless the provider is out of the office.  If it is urgent a covering provider will answer. (this does not include Medication refills).    MyChart:  We recommend all patients sign up for [employer].  Through this portal you can see your lab results, request refills, schedule appointments, pay your bill and send messages to the office.   [employer] messages will be answered within 1-2 business days unless the provider is out of the office.  For urgent matters, please call the office.  Appointments:  All appointments must be scheduled.  We ask all patients to schedule their next follow up appointment before they leave the office to make sure you will be able to be seen before you run out of medications.  24 hours notice is required to cancel or reschedule an appointment to avoid being marked as a no show.  You may be dismissed from the practice after 3 no shows.    LATE for Appointment: If you are 15 or more minutes late for your appointment, you may be asked to reschedule.  MA/LAB APPTS: Must be scheduled, cannot accept walk in lab visits.  We only draw labs for patients established in our office.  We only do injections for medications ordered by our office.  Acute Sick Visits:  Nothing other than acute complaint will be addressed at this visit.  TRADITIONAL MEDICARE  DOES NOT COVER PHYSICALS  MEDICARE WELLNESS VISITS: These are NOT physicals but the free annual visit offered by Medicare to discuss wellness issues. Medication refills, checkups, etc. will not be addressed during this visit.  Medication Refills: Refills are handled electronically so please contact your pharmacy for medication refills

## 2024-08-07 ENCOUNTER — OFFICE VISIT (OUTPATIENT)
Dept: FAMILY MEDICINE CLINIC | Age: 24
End: 2024-08-07
Payer: COMMERCIAL

## 2024-08-07 VITALS
SYSTOLIC BLOOD PRESSURE: 108 MMHG | BODY MASS INDEX: 17.2 KG/M2 | DIASTOLIC BLOOD PRESSURE: 68 MMHG | HEART RATE: 57 BPM | HEIGHT: 72 IN | WEIGHT: 127 LBS | OXYGEN SATURATION: 96 %

## 2024-08-07 DIAGNOSIS — M20.001 ACQUIRED DEFORMITY OF FINGER OF RIGHT HAND: ICD-10-CM

## 2024-08-07 DIAGNOSIS — Z00.00 ENCOUNTER FOR WELL ADULT EXAM WITHOUT ABNORMAL FINDINGS: Primary | ICD-10-CM

## 2024-08-07 PROCEDURE — 99395 PREV VISIT EST AGE 18-39: CPT | Performed by: NURSE PRACTITIONER

## 2024-08-07 RX ORDER — ASCORBIC ACID 500 MG
500 TABLET ORAL DAILY
COMMUNITY

## 2024-08-07 RX ORDER — MULTIVIT-MIN/IRON/FOLIC ACID/K 18-600-40
1 CAPSULE ORAL DAILY
COMMUNITY

## 2024-08-07 ASSESSMENT — ENCOUNTER SYMPTOMS
SORE THROAT: 0
WHEEZING: 0
EYE REDNESS: 0
NAUSEA: 0
EYE DISCHARGE: 0
VOMITING: 0
DIARRHEA: 0
COUGH: 0
SINUS PRESSURE: 0
ABDOMINAL PAIN: 0
EYE PAIN: 0
ABDOMINAL DISTENTION: 0
SINUS PAIN: 0
SHORTNESS OF BREATH: 0

## 2024-08-07 NOTE — PROGRESS NOTES
9m-55y), IM, 0.5mL 05/24/2012, 07/28/2017    Pneumococcal Conjugate 7-valent (Prevnar7) 03/28/2005    Poliovirus, IPOL, (age 6w+), SC/IM, 0.5mL 2000, 2000, 2000, 03/28/2005    TDaP, ADACEL (age 10y-64y), BOOSTRIX (age 10y+), IM, 0.5mL 05/24/2012, 06/16/2023    Varicella, VARIVAX, (age 12m+), SC, 0.5mL 03/28/2005, 09/06/2007        Health Maintenance Due   Topic Date Due    Pneumococcal 0-64 years Vaccine (1 of 2 - PCV) 03/22/2006    HPV vaccine (1 - Male 2-dose series) Never done    HIV screen  Never done    COVID-19 Vaccine (3 - 2023-24 season) 09/01/2023    Flu vaccine (1) Never done     Recommendations for Preventive Services Due: see orders and patient instructions/AVS.

## 2025-06-13 ENCOUNTER — OFFICE VISIT (OUTPATIENT)
Dept: FAMILY MEDICINE CLINIC | Age: 25
End: 2025-06-13
Payer: COMMERCIAL

## 2025-06-13 VITALS
OXYGEN SATURATION: 97 % | WEIGHT: 123 LBS | SYSTOLIC BLOOD PRESSURE: 110 MMHG | HEART RATE: 70 BPM | BODY MASS INDEX: 16.66 KG/M2 | DIASTOLIC BLOOD PRESSURE: 70 MMHG | HEIGHT: 72 IN

## 2025-06-13 DIAGNOSIS — R05.2 SUBACUTE COUGH: Primary | ICD-10-CM

## 2025-06-13 DIAGNOSIS — F41.9 ANXIETY: ICD-10-CM

## 2025-06-13 PROCEDURE — 99213 OFFICE O/P EST LOW 20 MIN: CPT | Performed by: NURSE PRACTITIONER

## 2025-06-13 SDOH — ECONOMIC STABILITY: FOOD INSECURITY: WITHIN THE PAST 12 MONTHS, THE FOOD YOU BOUGHT JUST DIDN'T LAST AND YOU DIDN'T HAVE MONEY TO GET MORE.: NEVER TRUE

## 2025-06-13 SDOH — ECONOMIC STABILITY: FOOD INSECURITY: WITHIN THE PAST 12 MONTHS, YOU WORRIED THAT YOUR FOOD WOULD RUN OUT BEFORE YOU GOT MONEY TO BUY MORE.: NEVER TRUE

## 2025-06-13 ASSESSMENT — PATIENT HEALTH QUESTIONNAIRE - PHQ9
SUM OF ALL RESPONSES TO PHQ QUESTIONS 1-9: 2
1. LITTLE INTEREST OR PLEASURE IN DOING THINGS: SEVERAL DAYS
2. FEELING DOWN, DEPRESSED OR HOPELESS: SEVERAL DAYS
SUM OF ALL RESPONSES TO PHQ QUESTIONS 1-9: 2

## 2025-06-13 NOTE — PROGRESS NOTES
Mathew Wayne (:  2000) is a 25 y.o. male,Established patient, here for evaluation of the following chief complaint(s):  Cough (PT C/O MOSTLY NON PRODUCTIVE COUGH FOR ABOUT A MONTH. HE STATES THE COUGH MOSTLY HAPPENS ONLY IN THE MORNING AFTER FIRST WAKING UP OR RIGHT BEFORE HE GOES TO BED AT NIGHT. PATIENT HAS TRIED HUMIDIFIER'S, FANS, AND KEEPING HIS MOUTH DRY TO TRY TO HELP. DOES NOT BOTHER HIM DURING THE DAY )      Assessment & Plan  Subacute cough   The etiology of the cough is likely allergic in nature, potentially triggered by environmental factors such as dust or mold in his new residence. He has been advised to initiate a regimen of Zyrtec, Claritin, or Allegra, to be taken daily for a duration of one week. Concurrently, he should ensure that his living environment is free from potential allergens. If there is no significant improvement in his symptoms following the aforementioned treatment plan, the introduction of Singulair will be considered. Should the addition of Singulair fail to alleviate his symptoms, further diagnostic measures, including pulmonary function tests, will be undertaken.         Anxiety   Following with psychologist.  Continue with management by them.  Patient did bring paper to the office which was scanned into media.  Breakdown of possible diagnoses based upon questionnaires/evaluation.                 Return if symptoms worsen or fail to improve.    SUBJECTIVE/OBJECTIVE:  History of Present Illness  The patient is a 25-year-old male presenting today for a cough.    He has been experiencing a persistent cough for approximately one month, which is occasionally accompanied by phlegm. The cough is predominantly dry, with sporadic episodes of productive coughing. He reports no shortness of breath. He has recently relocated to an older house, which he suspects may be contributing to his symptoms due to potential dust or mold exposure. He has not had any significant

## 2025-06-23 PROBLEM — F41.9 ANXIETY: Status: ACTIVE | Noted: 2025-06-23

## 2025-06-23 ASSESSMENT — ENCOUNTER SYMPTOMS
EYE PAIN: 0
COUGH: 1
WHEEZING: 0
SINUS PRESSURE: 0
EYE ITCHING: 1
EYE DISCHARGE: 0
SHORTNESS OF BREATH: 0
EYE REDNESS: 0
SINUS PAIN: 0
PHOTOPHOBIA: 0
RHINORRHEA: 0
SORE THROAT: 0

## 2025-06-23 NOTE — ASSESSMENT & PLAN NOTE
Following with psychologist.  Continue with management by them.  Patient did bring paper to the office which was scanned into media.  Breakdown of possible diagnoses based upon questionnaires/evaluation.